# Patient Record
Sex: FEMALE | Race: WHITE | NOT HISPANIC OR LATINO | Employment: OTHER | URBAN - METROPOLITAN AREA
[De-identification: names, ages, dates, MRNs, and addresses within clinical notes are randomized per-mention and may not be internally consistent; named-entity substitution may affect disease eponyms.]

---

## 2017-02-21 ENCOUNTER — HISTORICAL (OUTPATIENT)
Dept: HEMATOLOGY/ONCOLOGY | Facility: CLINIC | Age: 62
End: 2017-02-21

## 2017-06-16 ENCOUNTER — HISTORICAL (OUTPATIENT)
Dept: HEMATOLOGY/ONCOLOGY | Facility: CLINIC | Age: 62
End: 2017-06-16

## 2017-06-16 LAB
ABS NEUT (OLG): 3.61 X10(3)/MCL (ref 2.1–9.2)
ALBUMIN SERPL-MCNC: 4 GM/DL (ref 3.4–5)
ALBUMIN/GLOB SERPL: 1.3 {RATIO}
ALP SERPL-CCNC: 84 UNIT/L (ref 38–126)
ALT SERPL-CCNC: 21 UNIT/L (ref 12–78)
AST SERPL-CCNC: 18 UNIT/L (ref 15–37)
BASOPHILS # BLD AUTO: 0 X10(3)/MCL (ref 0–0.2)
BASOPHILS NFR BLD AUTO: 0.5 %
BILIRUB SERPL-MCNC: 0.5 MG/DL (ref 0.2–1)
BILIRUBIN DIRECT+TOT PNL SERPL-MCNC: 0.1 MG/DL (ref 0–0.2)
BILIRUBIN DIRECT+TOT PNL SERPL-MCNC: 0.4 MG/DL (ref 0–0.8)
BUN SERPL-MCNC: 15 MG/DL (ref 7–18)
CALCIUM SERPL-MCNC: 8.5 MG/DL (ref 8.5–10.1)
CEA SERPL-MCNC: 2 NG/ML (ref 0–3)
CHLORIDE SERPL-SCNC: 100 MMOL/L (ref 98–107)
CO2 SERPL-SCNC: 30 MMOL/L (ref 21–32)
CREAT SERPL-MCNC: 0.84 MG/DL (ref 0.55–1.02)
EOSINOPHIL # BLD AUTO: 0.1 X10(3)/MCL (ref 0–0.9)
EOSINOPHIL NFR BLD AUTO: 1.1 %
ERYTHROCYTE [DISTWIDTH] IN BLOOD BY AUTOMATED COUNT: 14.1 % (ref 11.5–17)
GLOBULIN SER-MCNC: 3.1 GM/DL (ref 2.4–3.5)
GLUCOSE SERPL-MCNC: 92 MG/DL (ref 74–106)
HCT VFR BLD AUTO: 42.1 % (ref 37–47)
HGB BLD-MCNC: 13.6 GM/DL (ref 12–16)
LYMPHOCYTES # BLD AUTO: 2 X10(3)/MCL (ref 0.6–4.6)
LYMPHOCYTES NFR BLD AUTO: 32.9 %
MCH RBC QN AUTO: 29.4 PG (ref 27–31)
MCHC RBC AUTO-ENTMCNC: 32.3 GM/DL (ref 33–36)
MCV RBC AUTO: 90.9 FL (ref 80–94)
MONOCYTES # BLD AUTO: 0.5 X10(3)/MCL (ref 0.1–1.3)
MONOCYTES NFR BLD AUTO: 7.4 %
NEUTROPHILS # BLD AUTO: 3.6 X10(3)/MCL (ref 2.1–9.2)
NEUTROPHILS NFR BLD AUTO: 58.1 %
PHOSPHATE SERPL-MCNC: 5.1 MG/DL (ref 2.5–4.9)
PLATELET # BLD AUTO: 266 X10(3)/MCL (ref 130–400)
PMV BLD AUTO: 9.9 FL (ref 9.4–12.4)
POTASSIUM SERPL-SCNC: 5.1 MMOL/L (ref 3.5–5.1)
PROT SERPL-MCNC: 7.1 GM/DL (ref 6.4–8.2)
RBC # BLD AUTO: 4.63 X10(6)/MCL (ref 4.2–5.4)
SODIUM SERPL-SCNC: 138 MMOL/L (ref 136–145)
WBC # SPEC AUTO: 6.2 X10(3)/MCL (ref 4.5–11.5)

## 2017-07-17 ENCOUNTER — HISTORICAL (OUTPATIENT)
Dept: RADIOLOGY | Facility: HOSPITAL | Age: 62
End: 2017-07-17

## 2017-09-12 ENCOUNTER — HISTORICAL (OUTPATIENT)
Dept: ADMINISTRATIVE | Facility: HOSPITAL | Age: 62
End: 2017-09-12

## 2017-09-12 LAB
ALBUMIN SERPL-MCNC: 3.9 GM/DL (ref 3.4–5)
ALBUMIN/GLOB SERPL: 1.1 {RATIO}
ALP SERPL-CCNC: 98 UNIT/L (ref 38–126)
ALT SERPL-CCNC: 22 UNIT/L (ref 12–78)
APPEARANCE, UA: NORMAL
AST SERPL-CCNC: 15 UNIT/L (ref 15–37)
BACTERIA SPEC CULT: NORMAL /HPF
BILIRUB SERPL-MCNC: 0.7 MG/DL (ref 0.2–1)
BILIRUB UR QL STRIP: NEGATIVE
BILIRUBIN DIRECT+TOT PNL SERPL-MCNC: 0.1 MG/DL (ref 0–0.2)
BILIRUBIN DIRECT+TOT PNL SERPL-MCNC: 0.6 MG/DL (ref 0–0.8)
BUN SERPL-MCNC: 17 MG/DL (ref 7–18)
CALCIUM SERPL-MCNC: 8.4 MG/DL (ref 8.5–10.1)
CHLORIDE SERPL-SCNC: 101 MMOL/L (ref 98–107)
CHOLEST SERPL-MCNC: 290 MG/DL (ref 0–200)
CHOLEST/HDLC SERPL: 3.8 {RATIO} (ref 0–4)
CO2 SERPL-SCNC: 32 MMOL/L (ref 21–32)
COLOR UR: YELLOW
CREAT SERPL-MCNC: 0.78 MG/DL (ref 0.55–1.02)
GLOBULIN SER-MCNC: 3.5 GM/DL (ref 2.4–3.5)
GLUCOSE (UA): NEGATIVE
GLUCOSE SERPL-MCNC: 96 MG/DL (ref 74–106)
HDLC SERPL-MCNC: 76 MG/DL (ref 35–60)
HGB UR QL STRIP: NEGATIVE
KETONES UR QL STRIP: NEGATIVE
LDLC SERPL CALC-MCNC: 186 MG/DL (ref 0–129)
LEUKOCYTE ESTERASE UR QL STRIP: NEGATIVE
NITRITE UR QL STRIP: NEGATIVE
PH UR STRIP: 7.5 [PH] (ref 5–9)
POTASSIUM SERPL-SCNC: 4.2 MMOL/L (ref 3.5–5.1)
PROT SERPL-MCNC: 7.4 GM/DL (ref 6.4–8.2)
PROT UR QL STRIP: NEGATIVE
RBC #/AREA URNS HPF: NORMAL /[HPF]
SODIUM SERPL-SCNC: 140 MMOL/L (ref 136–145)
SP GR UR STRIP: 1.01 (ref 1–1.03)
SQUAMOUS EPITHELIAL, UA: NORMAL
TRIGL SERPL-MCNC: 140 MG/DL (ref 30–150)
TSH SERPL-ACNC: 2.9 MIU/ML (ref 0.36–3.74)
UROBILINOGEN UR STRIP-ACNC: 0.2
VLDLC SERPL CALC-MCNC: 28 MG/DL
WBC #/AREA URNS HPF: NORMAL /[HPF]

## 2017-12-06 ENCOUNTER — HISTORICAL (OUTPATIENT)
Dept: ADMINISTRATIVE | Facility: HOSPITAL | Age: 62
End: 2017-12-06

## 2017-12-06 LAB — H PYLORI AB SER IA-ACNC: POSITIVE

## 2017-12-21 ENCOUNTER — HISTORICAL (OUTPATIENT)
Dept: HEMATOLOGY/ONCOLOGY | Facility: CLINIC | Age: 62
End: 2017-12-21

## 2017-12-21 LAB
ABS NEUT (OLG): 3.56 X10(3)/MCL (ref 2.1–9.2)
ALBUMIN SERPL-MCNC: 3.9 GM/DL (ref 3.4–5)
ALBUMIN/GLOB SERPL: 1.1 RATIO (ref 1.1–2)
ALP SERPL-CCNC: 108 UNIT/L (ref 38–126)
ALT SERPL-CCNC: 37 UNIT/L (ref 12–78)
AST SERPL-CCNC: 29 UNIT/L (ref 15–37)
BASOPHILS # BLD AUTO: 0 X10(3)/MCL (ref 0–0.2)
BASOPHILS NFR BLD AUTO: 0.7 %
BILIRUB SERPL-MCNC: 0.3 MG/DL (ref 0.2–1)
BILIRUBIN DIRECT+TOT PNL SERPL-MCNC: 0.1 MG/DL (ref 0–0.5)
BILIRUBIN DIRECT+TOT PNL SERPL-MCNC: 0.2 MG/DL (ref 0–0.8)
BUN SERPL-MCNC: 19 MG/DL (ref 7–18)
CALCIUM SERPL-MCNC: 7.9 MG/DL (ref 8.5–10.1)
CEA SERPL-MCNC: 1 NG/ML (ref 0–3)
CHLORIDE SERPL-SCNC: 100 MMOL/L (ref 98–107)
CO2 SERPL-SCNC: 28 MMOL/L (ref 21–32)
CREAT SERPL-MCNC: 0.82 MG/DL (ref 0.55–1.02)
DEPRECATED CALCIDIOL+CALCIFEROL SERPL-MC: 37.21 NG/ML (ref 30–80)
EOSINOPHIL # BLD AUTO: 0.1 X10(3)/MCL (ref 0–0.9)
EOSINOPHIL NFR BLD AUTO: 0.9 %
ERYTHROCYTE [DISTWIDTH] IN BLOOD BY AUTOMATED COUNT: 14 % (ref 11.5–17)
GLOBULIN SER-MCNC: 3.6 GM/DL (ref 2.4–3.5)
GLUCOSE SERPL-MCNC: 90 MG/DL (ref 74–106)
HCT VFR BLD AUTO: 41.9 % (ref 37–47)
HGB BLD-MCNC: 13.7 GM/DL (ref 12–16)
LYMPHOCYTES # BLD AUTO: 3.3 X10(3)/MCL (ref 0.6–4.6)
LYMPHOCYTES NFR BLD AUTO: 43.6 %
MCH RBC QN AUTO: 28.8 PG (ref 27–31)
MCHC RBC AUTO-ENTMCNC: 32.7 GM/DL (ref 33–36)
MCV RBC AUTO: 88.2 FL (ref 80–94)
MONOCYTES # BLD AUTO: 0.6 X10(3)/MCL (ref 0.1–1.3)
MONOCYTES NFR BLD AUTO: 7.6 %
NEUTROPHILS # BLD AUTO: 3.6 X10(3)/MCL (ref 2.1–9.2)
NEUTROPHILS NFR BLD AUTO: 47.2 %
PLATELET # BLD AUTO: 300 X10(3)/MCL (ref 130–400)
PMV BLD AUTO: 9.3 FL (ref 9.4–12.4)
POTASSIUM SERPL-SCNC: 3.1 MMOL/L (ref 3.5–5.1)
PROT SERPL-MCNC: 7.5 GM/DL (ref 6.4–8.2)
RBC # BLD AUTO: 4.75 X10(6)/MCL (ref 4.2–5.4)
SODIUM SERPL-SCNC: 136 MMOL/L (ref 136–145)
WBC # SPEC AUTO: 7.5 X10(3)/MCL (ref 4.5–11.5)

## 2018-01-05 ENCOUNTER — HISTORICAL (OUTPATIENT)
Dept: LAB | Facility: HOSPITAL | Age: 63
End: 2018-01-05

## 2018-02-14 ENCOUNTER — HISTORICAL (OUTPATIENT)
Dept: LAB | Facility: HOSPITAL | Age: 63
End: 2018-02-14

## 2018-02-14 ENCOUNTER — HISTORICAL (OUTPATIENT)
Dept: RADIOLOGY | Facility: HOSPITAL | Age: 63
End: 2018-02-14

## 2018-02-14 LAB
ALBUMIN SERPL-MCNC: 4.2 GM/DL (ref 3.4–5)
ALBUMIN/GLOB SERPL: 1.2 RATIO (ref 1.1–2)
ALP SERPL-CCNC: 104 UNIT/L (ref 38–126)
ALT SERPL-CCNC: 19 UNIT/L (ref 12–78)
AST SERPL-CCNC: 18 UNIT/L (ref 15–37)
BILIRUB SERPL-MCNC: 0.5 MG/DL (ref 0.2–1)
BILIRUBIN DIRECT+TOT PNL SERPL-MCNC: 0.1 MG/DL (ref 0–0.5)
BILIRUBIN DIRECT+TOT PNL SERPL-MCNC: 0.4 MG/DL (ref 0–0.8)
BUN SERPL-MCNC: 16 MG/DL (ref 7–18)
CALCIUM SERPL-MCNC: 8.1 MG/DL (ref 8.5–10.1)
CHLORIDE SERPL-SCNC: 99 MMOL/L (ref 98–107)
CO2 SERPL-SCNC: 28 MMOL/L (ref 21–32)
CREAT SERPL-MCNC: 0.7 MG/DL (ref 0.55–1.02)
DEPRECATED CALCIDIOL+CALCIFEROL SERPL-MC: 29.75 NG/ML (ref 30–80)
GLOBULIN SER-MCNC: 3.5 GM/DL (ref 2.4–3.5)
GLUCOSE SERPL-MCNC: 81 MG/DL (ref 74–106)
POTASSIUM SERPL-SCNC: 4 MMOL/L (ref 3.5–5.1)
PROT SERPL-MCNC: 7.7 GM/DL (ref 6.4–8.2)
SODIUM SERPL-SCNC: 137 MMOL/L (ref 136–145)

## 2018-06-22 ENCOUNTER — HISTORICAL (OUTPATIENT)
Dept: HEMATOLOGY/ONCOLOGY | Facility: CLINIC | Age: 63
End: 2018-06-22

## 2018-06-22 LAB
ABS NEUT (OLG): 3.93 X10(3)/MCL (ref 2.1–9.2)
ALBUMIN SERPL-MCNC: 4 GM/DL (ref 3.4–5)
ALBUMIN/GLOB SERPL: 1.2 {RATIO}
ALP SERPL-CCNC: 94 UNIT/L (ref 38–126)
ALT SERPL-CCNC: 22 UNIT/L (ref 12–78)
AST SERPL-CCNC: 19 UNIT/L (ref 15–37)
BASOPHILS # BLD AUTO: 0 X10(3)/MCL (ref 0–0.2)
BASOPHILS NFR BLD AUTO: 0.3 %
BILIRUB SERPL-MCNC: 0.5 MG/DL (ref 0.2–1)
BILIRUBIN DIRECT+TOT PNL SERPL-MCNC: 0.1 MG/DL (ref 0–0.2)
BILIRUBIN DIRECT+TOT PNL SERPL-MCNC: 0.4 MG/DL (ref 0–0.8)
BUN SERPL-MCNC: 17 MG/DL (ref 7–18)
CALCIUM SERPL-MCNC: 7.7 MG/DL (ref 8.5–10.1)
CEA SERPL-MCNC: 1.6 NG/ML (ref 0–3)
CHLORIDE SERPL-SCNC: 101 MMOL/L (ref 98–107)
CO2 SERPL-SCNC: 30 MMOL/L (ref 21–32)
CREAT SERPL-MCNC: 1.01 MG/DL (ref 0.55–1.02)
EOSINOPHIL # BLD AUTO: 0 X10(3)/MCL (ref 0–0.9)
EOSINOPHIL NFR BLD AUTO: 0.6 %
ERYTHROCYTE [DISTWIDTH] IN BLOOD BY AUTOMATED COUNT: 14.4 % (ref 11.5–17)
GLOBULIN SER-MCNC: 3.4 GM/DL (ref 2.4–3.5)
GLUCOSE SERPL-MCNC: 98 MG/DL (ref 74–106)
HCT VFR BLD AUTO: 42.6 % (ref 37–47)
HGB BLD-MCNC: 14 GM/DL (ref 12–16)
LYMPHOCYTES # BLD AUTO: 1.9 X10(3)/MCL (ref 0.6–4.6)
LYMPHOCYTES NFR BLD AUTO: 30.3 %
MCH RBC QN AUTO: 29.2 PG (ref 27–31)
MCHC RBC AUTO-ENTMCNC: 32.9 GM/DL (ref 33–36)
MCV RBC AUTO: 88.8 FL (ref 80–94)
MONOCYTES # BLD AUTO: 0.4 X10(3)/MCL (ref 0.1–1.3)
MONOCYTES NFR BLD AUTO: 6.1 %
NEUTROPHILS # BLD AUTO: 3.9 X10(3)/MCL (ref 2.1–9.2)
NEUTROPHILS NFR BLD AUTO: 62.7 %
PLATELET # BLD AUTO: 256 X10(3)/MCL (ref 130–400)
PMV BLD AUTO: 10.1 FL (ref 9.4–12.4)
POTASSIUM SERPL-SCNC: 4.2 MMOL/L (ref 3.5–5.1)
PROT SERPL-MCNC: 7.4 GM/DL (ref 6.4–8.2)
RBC # BLD AUTO: 4.8 X10(6)/MCL (ref 4.2–5.4)
SODIUM SERPL-SCNC: 139 MMOL/L (ref 136–145)
WBC # SPEC AUTO: 6.3 X10(3)/MCL (ref 4.5–11.5)

## 2018-09-24 ENCOUNTER — HISTORICAL (OUTPATIENT)
Dept: LAB | Facility: HOSPITAL | Age: 63
End: 2018-09-24

## 2018-09-24 LAB
ALBUMIN SERPL-MCNC: 3.9 GM/DL (ref 3.4–5)
ALBUMIN/GLOB SERPL: 1.2 RATIO (ref 1.1–2)
ALP SERPL-CCNC: 82 UNIT/L (ref 38–126)
ALT SERPL-CCNC: 20 UNIT/L (ref 12–78)
AST SERPL-CCNC: 16 UNIT/L (ref 15–37)
BILIRUB SERPL-MCNC: 0.5 MG/DL (ref 0.2–1)
BILIRUBIN DIRECT+TOT PNL SERPL-MCNC: 0.1 MG/DL (ref 0–0.5)
BILIRUBIN DIRECT+TOT PNL SERPL-MCNC: 0.4 MG/DL (ref 0–0.8)
BUN SERPL-MCNC: 18 MG/DL (ref 7–18)
CALCIUM SERPL-MCNC: 8.3 MG/DL (ref 8.5–10.1)
CHLORIDE SERPL-SCNC: 100 MMOL/L (ref 98–107)
CO2 SERPL-SCNC: 32 MMOL/L (ref 21–32)
CREAT SERPL-MCNC: 1.24 MG/DL (ref 0.55–1.02)
DEPRECATED CALCIDIOL+CALCIFEROL SERPL-MC: 35.27 NG/ML (ref 30–80)
GLOBULIN SER-MCNC: 3.2 GM/DL (ref 2.4–3.5)
GLUCOSE SERPL-MCNC: 95 MG/DL (ref 74–106)
MAGNESIUM SERPL-MCNC: 2 MG/DL (ref 1.8–2.4)
POTASSIUM SERPL-SCNC: 3.9 MMOL/L (ref 3.5–5.1)
PROT SERPL-MCNC: 7.1 GM/DL (ref 6.4–8.2)
SODIUM SERPL-SCNC: 138 MMOL/L (ref 136–145)

## 2019-01-16 ENCOUNTER — HISTORICAL (OUTPATIENT)
Dept: HEMATOLOGY/ONCOLOGY | Facility: CLINIC | Age: 64
End: 2019-01-16

## 2019-01-16 LAB
ABS NEUT (OLG): 3.21 X10(3)/MCL (ref 2.1–9.2)
ALBUMIN SERPL-MCNC: 4 GM/DL (ref 3.4–5)
ALBUMIN/GLOB SERPL: 1.2 {RATIO}
ALP SERPL-CCNC: 91 UNIT/L (ref 38–126)
ALT SERPL-CCNC: 20 UNIT/L (ref 12–78)
AST SERPL-CCNC: 14 UNIT/L (ref 15–37)
BASOPHILS # BLD AUTO: 0 X10(3)/MCL (ref 0–0.2)
BASOPHILS NFR BLD AUTO: 0.4 %
BILIRUB SERPL-MCNC: 0.3 MG/DL (ref 0.2–1)
BILIRUBIN DIRECT+TOT PNL SERPL-MCNC: 0.1 MG/DL (ref 0–0.2)
BILIRUBIN DIRECT+TOT PNL SERPL-MCNC: 0.2 MG/DL (ref 0–0.8)
BUN SERPL-MCNC: 17 MG/DL (ref 7–18)
CALCIUM SERPL-MCNC: 7.8 MG/DL (ref 8.5–10.1)
CEA SERPL-MCNC: 1.5 NG/ML (ref 0–3)
CHLORIDE SERPL-SCNC: 100 MMOL/L (ref 98–107)
CO2 SERPL-SCNC: 31 MMOL/L (ref 21–32)
CREAT SERPL-MCNC: 0.83 MG/DL (ref 0.55–1.02)
EOSINOPHIL # BLD AUTO: 0.1 X10(3)/MCL (ref 0–0.9)
EOSINOPHIL NFR BLD AUTO: 1 %
ERYTHROCYTE [DISTWIDTH] IN BLOOD BY AUTOMATED COUNT: 13.6 % (ref 11.5–17)
GLOBULIN SER-MCNC: 3.2 GM/DL (ref 2.4–3.5)
GLUCOSE SERPL-MCNC: 94 MG/DL (ref 74–106)
HCT VFR BLD AUTO: 44.3 % (ref 37–47)
HGB BLD-MCNC: 14 GM/DL (ref 12–16)
LYMPHOCYTES # BLD AUTO: 3.1 X10(3)/MCL (ref 0.6–4.6)
LYMPHOCYTES NFR BLD AUTO: 45.1 %
MCH RBC QN AUTO: 28.7 PG (ref 27–31)
MCHC RBC AUTO-ENTMCNC: 31.6 GM/DL (ref 33–36)
MCV RBC AUTO: 90.8 FL (ref 80–94)
MONOCYTES # BLD AUTO: 0.5 X10(3)/MCL (ref 0.1–1.3)
MONOCYTES NFR BLD AUTO: 7.2 %
NEUTROPHILS # BLD AUTO: 3.2 X10(3)/MCL (ref 2.1–9.2)
NEUTROPHILS NFR BLD AUTO: 46.3 %
PLATELET # BLD AUTO: 294 X10(3)/MCL (ref 130–400)
PMV BLD AUTO: 9.9 FL (ref 9.4–12.4)
POTASSIUM SERPL-SCNC: 3.6 MMOL/L (ref 3.5–5.1)
PROT SERPL-MCNC: 7.2 GM/DL (ref 6.4–8.2)
RBC # BLD AUTO: 4.88 X10(6)/MCL (ref 4.2–5.4)
SODIUM SERPL-SCNC: 140 MMOL/L (ref 136–145)
WBC # SPEC AUTO: 6.9 X10(3)/MCL (ref 4.5–11.5)

## 2019-03-22 ENCOUNTER — HISTORICAL (OUTPATIENT)
Dept: ADMINISTRATIVE | Facility: HOSPITAL | Age: 64
End: 2019-03-22

## 2019-03-22 LAB
BUN SERPL-MCNC: 20 MG/DL (ref 7–18)
CALCIUM SERPL-MCNC: 7.9 MG/DL (ref 8.5–10.1)
CHLORIDE SERPL-SCNC: 100 MMOL/L (ref 98–107)
CO2 SERPL-SCNC: 31 MMOL/L (ref 21–32)
CREAT SERPL-MCNC: 0.79 MG/DL (ref 0.55–1.02)
CREAT/UREA NIT SERPL: 25.3
GLUCOSE SERPL-MCNC: 90 MG/DL (ref 74–106)
POTASSIUM SERPL-SCNC: 4.3 MMOL/L (ref 3.5–5.1)
SODIUM SERPL-SCNC: 138 MMOL/L (ref 136–145)
TSH SERPL-ACNC: 1.87 MIU/L (ref 0.36–3.74)

## 2019-06-05 ENCOUNTER — HISTORICAL (OUTPATIENT)
Dept: ADMINISTRATIVE | Facility: HOSPITAL | Age: 64
End: 2019-06-05

## 2019-06-05 LAB
ABS NEUT (OLG): 3.49 X10(3)/MCL (ref 2.1–9.2)
ALBUMIN SERPL-MCNC: 4 GM/DL (ref 3.4–5)
ALBUMIN/GLOB SERPL: 1.1 RATIO (ref 1.1–2)
ALP SERPL-CCNC: 90 UNIT/L (ref 38–126)
ALT SERPL-CCNC: 21 UNIT/L (ref 12–78)
AST SERPL-CCNC: 18 UNIT/L (ref 15–37)
BASOPHILS # BLD AUTO: 0 X10(3)/MCL (ref 0–0.2)
BASOPHILS NFR BLD AUTO: 0.5 %
BILIRUB SERPL-MCNC: 0.5 MG/DL (ref 0.2–1)
BILIRUBIN DIRECT+TOT PNL SERPL-MCNC: 0.1 MG/DL (ref 0–0.5)
BILIRUBIN DIRECT+TOT PNL SERPL-MCNC: 0.4 MG/DL (ref 0–0.8)
BUN SERPL-MCNC: 21 MG/DL (ref 7–18)
CALCIUM SERPL-MCNC: 8.9 MG/DL (ref 8.5–10.1)
CEA SERPL-MCNC: 1.4 NG/ML (ref 0–3)
CHLORIDE SERPL-SCNC: 101 MMOL/L (ref 98–107)
CO2 SERPL-SCNC: 30 MMOL/L (ref 21–32)
CREAT SERPL-MCNC: 0.96 MG/DL (ref 0.55–1.02)
DEPRECATED CALCIDIOL+CALCIFEROL SERPL-MC: 32.28 NG/ML (ref 30–80)
EOSINOPHIL # BLD AUTO: 0.1 X10(3)/MCL (ref 0–0.9)
EOSINOPHIL NFR BLD AUTO: 1.1 %
ERYTHROCYTE [DISTWIDTH] IN BLOOD BY AUTOMATED COUNT: 13.2 % (ref 11.5–17)
GLOBULIN SER-MCNC: 3.5 GM/DL (ref 2.4–3.5)
GLUCOSE SERPL-MCNC: 72 MG/DL (ref 74–106)
HCT VFR BLD AUTO: 46 % (ref 37–47)
HGB BLD-MCNC: 14.6 GM/DL (ref 12–16)
LYMPHOCYTES # BLD AUTO: 2.1 X10(3)/MCL (ref 0.6–4.6)
LYMPHOCYTES NFR BLD AUTO: 34.1 %
MCH RBC QN AUTO: 28.7 PG (ref 27–31)
MCHC RBC AUTO-ENTMCNC: 31.7 GM/DL (ref 33–36)
MCV RBC AUTO: 90.6 FL (ref 80–94)
MONOCYTES # BLD AUTO: 0.5 X10(3)/MCL (ref 0.1–1.3)
MONOCYTES NFR BLD AUTO: 7.6 %
NEUTROPHILS # BLD AUTO: 3.5 X10(3)/MCL (ref 2.1–9.2)
NEUTROPHILS NFR BLD AUTO: 56.7 %
PLATELET # BLD AUTO: 305 X10(3)/MCL (ref 130–400)
PMV BLD AUTO: 9.8 FL (ref 9.4–12.4)
POTASSIUM SERPL-SCNC: 4.7 MMOL/L (ref 3.5–5.1)
PROT SERPL-MCNC: 7.5 GM/DL (ref 6.4–8.2)
RBC # BLD AUTO: 5.08 X10(6)/MCL (ref 4.2–5.4)
SODIUM SERPL-SCNC: 139 MMOL/L (ref 136–145)
WBC # SPEC AUTO: 6.2 X10(3)/MCL (ref 4.5–11.5)

## 2019-07-19 ENCOUNTER — HISTORICAL (OUTPATIENT)
Dept: RADIOLOGY | Facility: HOSPITAL | Age: 64
End: 2019-07-19

## 2019-12-23 ENCOUNTER — HISTORICAL (OUTPATIENT)
Dept: ADMINISTRATIVE | Facility: HOSPITAL | Age: 64
End: 2019-12-23

## 2019-12-23 LAB
ABS NEUT (OLG): 3.15 X10(3)/MCL (ref 2.1–9.2)
ALBUMIN SERPL-MCNC: 3.9 GM/DL (ref 3.4–5)
ALBUMIN/GLOB SERPL: 1.2 {RATIO}
ALP SERPL-CCNC: 96 UNIT/L (ref 38–126)
ALT SERPL-CCNC: 20 UNIT/L (ref 12–78)
APPEARANCE, UA: ABNORMAL
AST SERPL-CCNC: 15 UNIT/L (ref 15–37)
BACTERIA SPEC CULT: ABNORMAL /HPF
BASOPHILS # BLD AUTO: 0 X10(3)/MCL (ref 0–0.2)
BASOPHILS NFR BLD AUTO: 1 %
BILIRUB SERPL-MCNC: 1 MG/DL (ref 0.2–1)
BILIRUB UR QL STRIP: NEGATIVE
BILIRUBIN DIRECT+TOT PNL SERPL-MCNC: 0.2 MG/DL (ref 0–0.2)
BILIRUBIN DIRECT+TOT PNL SERPL-MCNC: 0.8 MG/DL (ref 0–0.8)
BUN SERPL-MCNC: 17 MG/DL (ref 7–18)
CALCIUM SERPL-MCNC: 8.1 MG/DL (ref 8.5–10.1)
CEA SERPL-MCNC: 1.9 NG/ML (ref 0–3)
CHLORIDE SERPL-SCNC: 102 MMOL/L (ref 98–107)
CHOLEST SERPL-MCNC: 279 MG/DL (ref 0–200)
CHOLEST/HDLC SERPL: 3.5 {RATIO} (ref 0–4)
CO2 SERPL-SCNC: 30 MMOL/L (ref 21–32)
COLOR UR: YELLOW
CREAT SERPL-MCNC: 0.86 MG/DL (ref 0.55–1.02)
DEPRECATED CALCIDIOL+CALCIFEROL SERPL-MC: 35.82 NG/ML (ref 30–80)
EOSINOPHIL # BLD AUTO: 0.1 X10(3)/MCL (ref 0–0.9)
EOSINOPHIL NFR BLD AUTO: 1 %
ERYTHROCYTE [DISTWIDTH] IN BLOOD BY AUTOMATED COUNT: 13.9 % (ref 11.5–17)
EST. AVERAGE GLUCOSE BLD GHB EST-MCNC: 131 MG/DL
GLOBULIN SER-MCNC: 3.2 GM/DL (ref 2.4–3.5)
GLUCOSE (UA): NEGATIVE
GLUCOSE SERPL-MCNC: 104 MG/DL (ref 74–106)
HBA1C MFR BLD: 6.2 % (ref 4.2–6.3)
HCT VFR BLD AUTO: 44.6 % (ref 37–47)
HDLC SERPL-MCNC: 80 MG/DL (ref 35–60)
HGB BLD-MCNC: 14.4 GM/DL (ref 12–16)
HGB UR QL STRIP: ABNORMAL
KETONES UR QL STRIP: NEGATIVE
LDLC SERPL CALC-MCNC: 179 MG/DL (ref 0–129)
LEUKOCYTE ESTERASE UR QL STRIP: ABNORMAL
LYMPHOCYTES # BLD AUTO: 2.2 X10(3)/MCL (ref 0.6–4.6)
LYMPHOCYTES NFR BLD AUTO: 38 %
MCH RBC QN AUTO: 29 PG (ref 27–31)
MCHC RBC AUTO-ENTMCNC: 32.3 GM/DL (ref 33–36)
MCV RBC AUTO: 89.7 FL (ref 80–94)
MONOCYTES # BLD AUTO: 0.4 X10(3)/MCL (ref 0.1–1.3)
MONOCYTES NFR BLD AUTO: 6 %
NEUTROPHILS # BLD AUTO: 3.15 X10(3)/MCL (ref 2.1–9.2)
NEUTROPHILS NFR BLD AUTO: 53 %
NITRITE UR QL STRIP: NEGATIVE
PH UR STRIP: 8 [PH] (ref 5–9)
PLATELET # BLD AUTO: 265 X10(3)/MCL (ref 130–400)
PMV BLD AUTO: 9.8 FL (ref 9.4–12.4)
POTASSIUM SERPL-SCNC: 4.5 MMOL/L (ref 3.5–5.1)
PROT SERPL-MCNC: 7.1 GM/DL (ref 6.4–8.2)
PROT UR QL STRIP: NEGATIVE
RBC # BLD AUTO: 4.97 X10(6)/MCL (ref 4.2–5.4)
RBC #/AREA URNS HPF: 10 /HPF (ref 0–2)
SODIUM SERPL-SCNC: 139 MMOL/L (ref 136–145)
SP GR UR STRIP: 1.02 (ref 1–1.03)
SQUAMOUS EPITHELIAL, UA: ABNORMAL
TRIGL SERPL-MCNC: 99 MG/DL (ref 30–150)
TSH SERPL-ACNC: 2.39 MIU/L (ref 0.36–3.74)
UROBILINOGEN UR STRIP-ACNC: 0.2
VLDLC SERPL CALC-MCNC: 20 MG/DL
WBC # SPEC AUTO: 5.9 X10(3)/MCL (ref 4.5–11.5)
WBC #/AREA URNS HPF: ABNORMAL /[HPF]

## 2020-05-29 ENCOUNTER — HISTORICAL (OUTPATIENT)
Dept: ADMINISTRATIVE | Facility: HOSPITAL | Age: 65
End: 2020-05-29

## 2020-05-29 LAB
ALBUMIN SERPL-MCNC: 4.3 GM/DL (ref 3.4–4.8)
ALBUMIN/GLOB SERPL: 1.6 RATIO (ref 1.1–2)
ALP SERPL-CCNC: 92 UNIT/L (ref 40–150)
ALT SERPL-CCNC: 13 UNIT/L (ref 0–55)
APPEARANCE, UA: CLEAR
AST SERPL-CCNC: 17 UNIT/L (ref 5–34)
BACTERIA SPEC CULT: NORMAL /HPF
BILIRUB SERPL-MCNC: 0.6 MG/DL
BILIRUB UR QL STRIP: NEGATIVE
BILIRUBIN DIRECT+TOT PNL SERPL-MCNC: 0.2 MG/DL (ref 0–0.5)
BILIRUBIN DIRECT+TOT PNL SERPL-MCNC: 0.4 MG/DL (ref 0–0.8)
BUN SERPL-MCNC: 13.7 MG/DL (ref 9.8–20.1)
CALCIUM SERPL-MCNC: 8.1 MG/DL (ref 8.4–10.2)
CHLORIDE SERPL-SCNC: 102 MMOL/L (ref 98–107)
CHOLEST SERPL-MCNC: 290 MG/DL
CHOLEST/HDLC SERPL: 5 {RATIO} (ref 0–5)
CO2 SERPL-SCNC: 31 MMOL/L (ref 23–31)
COLOR UR: YELLOW
CREAT SERPL-MCNC: 0.88 MG/DL (ref 0.55–1.02)
EST. AVERAGE GLUCOSE BLD GHB EST-MCNC: 128.4 MG/DL
GLOBULIN SER-MCNC: 2.7 GM/DL (ref 2.4–3.5)
GLUCOSE (UA): NEGATIVE
GLUCOSE SERPL-MCNC: 102 MG/DL (ref 82–115)
HBA1C MFR BLD: 6.1 %
HDLC SERPL-MCNC: 61 MG/DL (ref 35–60)
HGB UR QL STRIP: NEGATIVE
KETONES UR QL STRIP: NEGATIVE
LDLC SERPL CALC-MCNC: 201 MG/DL (ref 50–140)
LEUKOCYTE ESTERASE UR QL STRIP: NEGATIVE
NITRITE UR QL STRIP: NEGATIVE
PH UR STRIP: >=9 [PH] (ref 5–9)
POTASSIUM SERPL-SCNC: 4.7 MMOL/L (ref 3.5–5.1)
PROT SERPL-MCNC: 7 GM/DL (ref 5.8–7.6)
PROT UR QL STRIP: NEGATIVE
RBC #/AREA URNS HPF: NORMAL /[HPF]
SODIUM SERPL-SCNC: 142 MMOL/L (ref 136–145)
SP GR UR STRIP: 1.02 (ref 1–1.03)
SQUAMOUS EPITHELIAL, UA: NORMAL
TRIGL SERPL-MCNC: 138 MG/DL (ref 37–140)
TSH SERPL-ACNC: 1.08 UIU/ML (ref 0.35–4.94)
UROBILINOGEN UR STRIP-ACNC: 0.2
VLDLC SERPL CALC-MCNC: 28 MG/DL
WBC #/AREA URNS HPF: NORMAL /[HPF]

## 2020-06-15 ENCOUNTER — HISTORICAL (OUTPATIENT)
Dept: RADIOLOGY | Facility: HOSPITAL | Age: 65
End: 2020-06-15

## 2020-06-26 ENCOUNTER — HISTORICAL (OUTPATIENT)
Dept: HEMATOLOGY/ONCOLOGY | Facility: CLINIC | Age: 65
End: 2020-06-26

## 2020-06-26 LAB
ABS NEUT (OLG): 3.63 X10(3)/MCL (ref 2.1–9.2)
ALBUMIN SERPL-MCNC: 4.2 GM/DL (ref 3.4–5)
ALBUMIN/GLOB SERPL: 1.4 RATIO (ref 1.1–2)
ALP SERPL-CCNC: 101 UNIT/L (ref 40–150)
ALT SERPL-CCNC: 21 UNIT/L (ref 0–55)
AST SERPL-CCNC: 27 UNIT/L (ref 5–34)
BASOPHILS # BLD AUTO: 0 X10(3)/MCL (ref 0–0.2)
BASOPHILS NFR BLD AUTO: 0.5 %
BILIRUB SERPL-MCNC: 0.7 MG/DL
BILIRUBIN DIRECT+TOT PNL SERPL-MCNC: 0.2 MG/DL (ref 0–0.5)
BILIRUBIN DIRECT+TOT PNL SERPL-MCNC: 0.5 MG/DL (ref 0–0.8)
BUN SERPL-MCNC: 14.1 MG/DL (ref 9.8–20.1)
CALCIUM SERPL-MCNC: 8.1 MG/DL (ref 8.4–10.2)
CEA SERPL-MCNC: <1.73 MG/ML (ref 0–3)
CHLORIDE SERPL-SCNC: 98 MMOL/L (ref 98–107)
CO2 SERPL-SCNC: 29 MMOL/L (ref 23–31)
CREAT SERPL-MCNC: 0.81 MG/DL (ref 0.55–1.02)
EOSINOPHIL # BLD AUTO: 0 X10(3)/MCL (ref 0–0.9)
EOSINOPHIL NFR BLD AUTO: 0.8 %
ERYTHROCYTE [DISTWIDTH] IN BLOOD BY AUTOMATED COUNT: 13.6 % (ref 11.5–17)
GLOBULIN SER-MCNC: 3.1 GM/DL (ref 2.4–3.5)
GLUCOSE SERPL-MCNC: 145 MG/DL (ref 82–115)
HCT VFR BLD AUTO: 45.7 % (ref 37–47)
HGB BLD-MCNC: 14.6 GM/DL (ref 12–16)
LYMPHOCYTES # BLD AUTO: 2.2 X10(3)/MCL (ref 0.6–4.6)
LYMPHOCYTES NFR BLD AUTO: 34.3 %
MCH RBC QN AUTO: 29 PG (ref 27–31)
MCHC RBC AUTO-ENTMCNC: 31.9 GM/DL (ref 33–36)
MCV RBC AUTO: 90.9 FL (ref 80–94)
MONOCYTES # BLD AUTO: 0.4 X10(3)/MCL (ref 0.1–1.3)
MONOCYTES NFR BLD AUTO: 6.5 %
NEUTROPHILS # BLD AUTO: 3.6 X10(3)/MCL (ref 2.1–9.2)
NEUTROPHILS NFR BLD AUTO: 57.7 %
PLATELET # BLD AUTO: 274 X10(3)/MCL (ref 130–400)
PMV BLD AUTO: 9.9 FL (ref 9.4–12.4)
POTASSIUM SERPL-SCNC: 4.8 MMOL/L (ref 3.5–5.1)
PROT SERPL-MCNC: 7.3 GM/DL (ref 5.8–7.6)
RBC # BLD AUTO: 5.03 X10(6)/MCL (ref 4.2–5.4)
SODIUM SERPL-SCNC: 140 MMOL/L (ref 136–145)
WBC # SPEC AUTO: 6.3 X10(3)/MCL (ref 4.5–11.5)

## 2020-07-10 ENCOUNTER — HISTORICAL (OUTPATIENT)
Dept: ADMINISTRATIVE | Facility: HOSPITAL | Age: 65
End: 2020-07-10

## 2020-07-10 LAB
CHOLEST SERPL-MCNC: 308 MG/DL
CHOLEST/HDLC SERPL: 5 {RATIO} (ref 0–5)
HDLC SERPL-MCNC: 68 MG/DL (ref 35–60)
LDLC SERPL CALC-MCNC: 215 MG/DL (ref 50–140)
TRIGL SERPL-MCNC: 127 MG/DL (ref 37–140)
VLDLC SERPL CALC-MCNC: 25 MG/DL

## 2020-11-24 LAB — CRC RECOMMENDATION EXT: NORMAL

## 2021-01-14 ENCOUNTER — HISTORICAL (OUTPATIENT)
Dept: HEMATOLOGY/ONCOLOGY | Facility: CLINIC | Age: 66
End: 2021-01-14

## 2021-01-14 LAB
ABS NEUT (OLG): 3.67 X10(3)/MCL (ref 2.1–9.2)
ALBUMIN SERPL-MCNC: 4.2 GM/DL (ref 3.4–4.8)
ALBUMIN/GLOB SERPL: 1.4 RATIO (ref 1.1–2)
ALP SERPL-CCNC: 90 UNIT/L (ref 40–150)
ALT SERPL-CCNC: 16 UNIT/L (ref 0–55)
AST SERPL-CCNC: 20 UNIT/L (ref 5–34)
BASOPHILS # BLD AUTO: 0 X10(3)/MCL (ref 0–0.2)
BASOPHILS NFR BLD AUTO: 1 %
BILIRUB SERPL-MCNC: 0.7 MG/DL
BILIRUBIN DIRECT+TOT PNL SERPL-MCNC: 0.2 MG/DL (ref 0–0.5)
BILIRUBIN DIRECT+TOT PNL SERPL-MCNC: 0.5 MG/DL (ref 0–0.8)
BUN SERPL-MCNC: 11.5 MG/DL (ref 9.8–20.1)
CALCIUM SERPL-MCNC: 7.5 MG/DL (ref 8.4–10.2)
CEA SERPL-MCNC: <1.73 NG/ML (ref 0–3)
CHLORIDE SERPL-SCNC: 99 MMOL/L (ref 98–107)
CO2 SERPL-SCNC: 27 MMOL/L (ref 23–31)
CREAT SERPL-MCNC: 0.77 MG/DL (ref 0.55–1.02)
EOSINOPHIL # BLD AUTO: 0.1 X10(3)/MCL (ref 0–0.9)
EOSINOPHIL NFR BLD AUTO: 1 %
ERYTHROCYTE [DISTWIDTH] IN BLOOD BY AUTOMATED COUNT: 13.7 % (ref 11.5–17)
GLOBULIN SER-MCNC: 2.9 GM/DL (ref 2.4–3.5)
GLUCOSE SERPL-MCNC: 111 MG/DL (ref 82–115)
HCT VFR BLD AUTO: 44.4 % (ref 37–47)
HGB BLD-MCNC: 14.5 GM/DL (ref 12–16)
LYMPHOCYTES # BLD AUTO: 2.1 X10(3)/MCL (ref 0.6–4.6)
LYMPHOCYTES NFR BLD AUTO: 34 %
MCH RBC QN AUTO: 28.9 PG (ref 27–31)
MCHC RBC AUTO-ENTMCNC: 32.7 GM/DL (ref 33–36)
MCV RBC AUTO: 88.6 FL (ref 80–94)
MONOCYTES # BLD AUTO: 0.4 X10(3)/MCL (ref 0.1–1.3)
MONOCYTES NFR BLD AUTO: 7 %
NEUTROPHILS # BLD AUTO: 3.7 X10(3)/MCL (ref 2.1–9.2)
NEUTROPHILS NFR BLD AUTO: 58 %
PLATELET # BLD AUTO: 286 X10(3)/MCL (ref 130–400)
PMV BLD AUTO: 9.8 FL (ref 9.4–12.4)
POTASSIUM SERPL-SCNC: 4.1 MMOL/L (ref 3.5–5.1)
PROT SERPL-MCNC: 7.1 GM/DL (ref 5.8–7.6)
RBC # BLD AUTO: 5.01 X10(6)/MCL (ref 4.2–5.4)
SODIUM SERPL-SCNC: 139 MMOL/L (ref 136–145)
WBC # SPEC AUTO: 6.3 X10(3)/MCL (ref 4.5–11.5)

## 2021-02-12 ENCOUNTER — HISTORICAL (OUTPATIENT)
Dept: ADMINISTRATIVE | Facility: HOSPITAL | Age: 66
End: 2021-02-12

## 2021-02-12 LAB
ABS NEUT (OLG): 3.58 X10(3)/MCL (ref 2.1–9.2)
ALBUMIN SERPL-MCNC: 4.1 GM/DL (ref 3.4–4.8)
ALBUMIN/GLOB SERPL: 1.8 RATIO (ref 1.1–2)
ALP SERPL-CCNC: 84 UNIT/L (ref 40–150)
ALT SERPL-CCNC: 12 UNIT/L (ref 0–55)
APPEARANCE, UA: CLEAR
AST SERPL-CCNC: 17 UNIT/L (ref 5–34)
BACTERIA SPEC CULT: ABNORMAL /HPF
BASOPHILS # BLD AUTO: 0 X10(3)/MCL (ref 0–0.2)
BASOPHILS NFR BLD AUTO: 1 %
BILIRUB SERPL-MCNC: 0.7 MG/DL
BILIRUB UR QL STRIP: NEGATIVE
BILIRUBIN DIRECT+TOT PNL SERPL-MCNC: 0.2 MG/DL (ref 0–0.5)
BILIRUBIN DIRECT+TOT PNL SERPL-MCNC: 0.5 MG/DL (ref 0–0.8)
BUN SERPL-MCNC: 12.3 MG/DL (ref 9.8–20.1)
CALCIUM SERPL-MCNC: 7.8 MG/DL (ref 8.4–10.2)
CHLORIDE SERPL-SCNC: 102 MMOL/L (ref 98–107)
CHOLEST SERPL-MCNC: 266 MG/DL
CHOLEST/HDLC SERPL: 5 {RATIO} (ref 0–5)
CO2 SERPL-SCNC: 28 MMOL/L (ref 23–31)
COLOR UR: YELLOW
CREAT SERPL-MCNC: 0.81 MG/DL (ref 0.55–1.02)
DEPRECATED CALCIDIOL+CALCIFEROL SERPL-MC: 43.6 NG/ML (ref 30–80)
EOSINOPHIL # BLD AUTO: 0.1 X10(3)/MCL (ref 0–0.9)
EOSINOPHIL NFR BLD AUTO: 1 %
ERYTHROCYTE [DISTWIDTH] IN BLOOD BY AUTOMATED COUNT: 13.9 % (ref 11.5–17)
EST. AVERAGE GLUCOSE BLD GHB EST-MCNC: 125.5 MG/DL
GLOBULIN SER-MCNC: 2.3 GM/DL (ref 2.4–3.5)
GLUCOSE (UA): NEGATIVE
GLUCOSE SERPL-MCNC: 98 MG/DL (ref 82–115)
HBA1C MFR BLD: 6 %
HCT VFR BLD AUTO: 45.2 % (ref 37–47)
HDLC SERPL-MCNC: 59 MG/DL (ref 35–60)
HGB BLD-MCNC: 14.4 GM/DL (ref 12–16)
HGB UR QL STRIP: ABNORMAL
KETONES UR QL STRIP: NEGATIVE
LDLC SERPL CALC-MCNC: 189 MG/DL (ref 50–140)
LEUKOCYTE ESTERASE UR QL STRIP: ABNORMAL
LYMPHOCYTES # BLD AUTO: 1.9 X10(3)/MCL (ref 0.6–4.6)
LYMPHOCYTES NFR BLD AUTO: 31 %
MCH RBC QN AUTO: 28.9 PG (ref 27–31)
MCHC RBC AUTO-ENTMCNC: 31.9 GM/DL (ref 33–36)
MCV RBC AUTO: 90.6 FL (ref 80–94)
MONOCYTES # BLD AUTO: 0.5 X10(3)/MCL (ref 0.1–1.3)
MONOCYTES NFR BLD AUTO: 8 %
NEUTROPHILS # BLD AUTO: 3.58 X10(3)/MCL (ref 2.1–9.2)
NEUTROPHILS NFR BLD AUTO: 59 %
NITRITE UR QL STRIP: NEGATIVE
PH UR STRIP: 7.5 [PH] (ref 5–9)
PLATELET # BLD AUTO: 290 X10(3)/MCL (ref 130–400)
PMV BLD AUTO: 10.8 FL (ref 9.4–12.4)
POTASSIUM SERPL-SCNC: 4 MMOL/L (ref 3.5–5.1)
PROT SERPL-MCNC: 6.4 GM/DL (ref 5.8–7.6)
PROT UR QL STRIP: NEGATIVE
RBC # BLD AUTO: 4.99 X10(6)/MCL (ref 4.2–5.4)
RBC #/AREA URNS HPF: ABNORMAL /HPF
SODIUM SERPL-SCNC: 141 MMOL/L (ref 136–145)
SP GR UR STRIP: 1.02 (ref 1–1.03)
SQUAMOUS EPITHELIAL, UA: 0 /HPF (ref 0–4)
TRIGL SERPL-MCNC: 90 MG/DL (ref 37–140)
TSH SERPL-ACNC: 0.82 UIU/ML (ref 0.35–4.94)
UA WBC MAN: ABNORMAL /HPF
UROBILINOGEN UR STRIP-ACNC: 0.2
VLDLC SERPL CALC-MCNC: 18 MG/DL
WBC # SPEC AUTO: 6 X10(3)/MCL (ref 4.5–11.5)

## 2021-04-19 ENCOUNTER — HISTORICAL (OUTPATIENT)
Dept: ADMINISTRATIVE | Facility: HOSPITAL | Age: 66
End: 2021-04-19

## 2021-04-19 LAB
ABS NEUT (OLG): 3.63 X10(3)/MCL (ref 2.1–9.2)
ALBUMIN SERPL-MCNC: 4.2 GM/DL (ref 3.4–4.8)
ALBUMIN/GLOB SERPL: 1.5 RATIO (ref 1.1–2)
ALP SERPL-CCNC: 88 UNIT/L (ref 40–150)
ALT SERPL-CCNC: 16 UNIT/L (ref 0–55)
APPEARANCE, UA: ABNORMAL
AST SERPL-CCNC: 20 UNIT/L (ref 5–34)
BACTERIA SPEC CULT: ABNORMAL /HPF
BASOPHILS # BLD AUTO: 0 X10(3)/MCL (ref 0–0.2)
BASOPHILS NFR BLD AUTO: 1 %
BILIRUB SERPL-MCNC: 0.6 MG/DL
BILIRUB UR QL STRIP: NEGATIVE
BILIRUBIN DIRECT+TOT PNL SERPL-MCNC: 0.2 MG/DL (ref 0–0.5)
BILIRUBIN DIRECT+TOT PNL SERPL-MCNC: 0.4 MG/DL (ref 0–0.8)
BUN SERPL-MCNC: 11.3 MG/DL (ref 9.8–20.1)
CALCIUM SERPL-MCNC: 8.6 MG/DL (ref 8.4–10.2)
CHLORIDE SERPL-SCNC: 102 MMOL/L (ref 98–107)
CHOLEST SERPL-MCNC: 181 MG/DL
CHOLEST/HDLC SERPL: 3 {RATIO} (ref 0–5)
CO2 SERPL-SCNC: 32 MMOL/L (ref 23–31)
COLOR UR: YELLOW
CREAT SERPL-MCNC: 0.8 MG/DL (ref 0.55–1.02)
DEPRECATED CALCIDIOL+CALCIFEROL SERPL-MC: 46.1 NG/ML (ref 30–80)
EOSINOPHIL # BLD AUTO: 0.1 X10(3)/MCL (ref 0–0.9)
EOSINOPHIL NFR BLD AUTO: 1 %
ERYTHROCYTE [DISTWIDTH] IN BLOOD BY AUTOMATED COUNT: 13.5 % (ref 11.5–17)
EST. AVERAGE GLUCOSE BLD GHB EST-MCNC: 125.5 MG/DL
GLOBULIN SER-MCNC: 2.8 GM/DL (ref 2.4–3.5)
GLUCOSE (UA): NEGATIVE
GLUCOSE SERPL-MCNC: 105 MG/DL (ref 82–115)
HBA1C MFR BLD: 6 %
HCT VFR BLD AUTO: 46.2 % (ref 37–47)
HDLC SERPL-MCNC: 63 MG/DL (ref 35–60)
HGB BLD-MCNC: 14.9 GM/DL (ref 12–16)
HGB UR QL STRIP: ABNORMAL
KETONES UR QL STRIP: NEGATIVE
LDLC SERPL CALC-MCNC: 98 MG/DL (ref 50–140)
LEUKOCYTE ESTERASE UR QL STRIP: ABNORMAL
LYMPHOCYTES # BLD AUTO: 2.6 X10(3)/MCL (ref 0.6–4.6)
LYMPHOCYTES NFR BLD AUTO: 38 %
MCH RBC QN AUTO: 28.9 PG (ref 27–31)
MCHC RBC AUTO-ENTMCNC: 32.3 GM/DL (ref 33–36)
MCV RBC AUTO: 89.5 FL (ref 80–94)
MONOCYTES # BLD AUTO: 0.5 X10(3)/MCL (ref 0.1–1.3)
MONOCYTES NFR BLD AUTO: 7 %
NEUTROPHILS # BLD AUTO: 3.63 X10(3)/MCL (ref 2.1–9.2)
NEUTROPHILS NFR BLD AUTO: 54 %
NITRITE UR QL STRIP: NEGATIVE
PH UR STRIP: 7 [PH] (ref 5–9)
PLATELET # BLD AUTO: 306 X10(3)/MCL (ref 130–400)
PMV BLD AUTO: 10.7 FL (ref 9.4–12.4)
POTASSIUM SERPL-SCNC: 4.3 MMOL/L (ref 3.5–5.1)
PROT SERPL-MCNC: 7 GM/DL (ref 5.8–7.6)
PROT UR QL STRIP: NEGATIVE
RBC # BLD AUTO: 5.16 X10(6)/MCL (ref 4.2–5.4)
RBC #/AREA URNS HPF: 20 /HPF (ref 0–2)
SODIUM SERPL-SCNC: 141 MMOL/L (ref 136–145)
SP GR UR STRIP: 1.02 (ref 1–1.03)
SQUAMOUS EPITHELIAL, UA: 18 /HPF (ref 0–4)
TRIGL SERPL-MCNC: 99 MG/DL (ref 37–140)
TSH SERPL-ACNC: 1.61 UIU/ML (ref 0.35–4.94)
UROBILINOGEN UR STRIP-ACNC: 0.2
VLDLC SERPL CALC-MCNC: 20 MG/DL
WBC # SPEC AUTO: 6.8 X10(3)/MCL (ref 4.5–11.5)
WBC #/AREA URNS HPF: 13 /HPF (ref 0–3)

## 2021-04-21 ENCOUNTER — HISTORICAL (OUTPATIENT)
Dept: ADMINISTRATIVE | Facility: HOSPITAL | Age: 66
End: 2021-04-21

## 2021-04-21 LAB
APPEARANCE, UA: CLEAR
BACTERIA SPEC CULT: ABNORMAL /HPF
BILIRUB UR QL STRIP: NEGATIVE
COLOR UR: YELLOW
FINAL CULTURE: NORMAL
GLUCOSE (UA): NEGATIVE
HGB UR QL STRIP: NEGATIVE
KETONES UR QL STRIP: NEGATIVE
LEUKOCYTE ESTERASE UR QL STRIP: NEGATIVE
NITRITE UR QL STRIP: NEGATIVE
PH UR STRIP: 6.5 [PH] (ref 5–9)
PROT UR QL STRIP: NEGATIVE
RBC #/AREA URNS HPF: ABNORMAL /HPF
SP GR UR STRIP: 1.01 (ref 1–1.03)
SQUAMOUS EPITHELIAL, UA: ABNORMAL /HPF
UROBILINOGEN UR STRIP-ACNC: 0.2
WBC #/AREA URNS HPF: ABNORMAL /HPF

## 2021-04-23 ENCOUNTER — HISTORICAL (OUTPATIENT)
Dept: RADIOLOGY | Facility: HOSPITAL | Age: 66
End: 2021-04-23

## 2021-07-09 ENCOUNTER — HISTORICAL (OUTPATIENT)
Dept: HEMATOLOGY/ONCOLOGY | Facility: CLINIC | Age: 66
End: 2021-07-09

## 2021-07-09 LAB
ABS NEUT (OLG): 3.74 X10(3)/MCL (ref 2.1–9.2)
ALBUMIN SERPL-MCNC: 3.7 GM/DL (ref 3.4–4.8)
ALBUMIN/GLOB SERPL: 1.3 RATIO (ref 1.1–2)
ALP SERPL-CCNC: 91 UNIT/L (ref 40–150)
ALT SERPL-CCNC: 17 UNIT/L (ref 0–55)
AST SERPL-CCNC: 20 UNIT/L (ref 5–34)
BASOPHILS # BLD AUTO: 0 X10(3)/MCL (ref 0–0.2)
BASOPHILS NFR BLD AUTO: 0.5 %
BILIRUB SERPL-MCNC: 0.7 MG/DL
BILIRUBIN DIRECT+TOT PNL SERPL-MCNC: 0.3 MG/DL (ref 0–0.5)
BILIRUBIN DIRECT+TOT PNL SERPL-MCNC: 0.4 MG/DL (ref 0–0.8)
BUN SERPL-MCNC: 11.1 MG/DL (ref 9.8–20.1)
CALCIUM SERPL-MCNC: 7.4 MG/DL (ref 8.4–10.2)
CEA SERPL-MCNC: <1.73 NG/ML (ref 0–3)
CHLORIDE SERPL-SCNC: 102 MMOL/L (ref 98–107)
CO2 SERPL-SCNC: 28 MMOL/L (ref 23–31)
CREAT SERPL-MCNC: 0.79 MG/DL (ref 0.55–1.02)
EOSINOPHIL # BLD AUTO: 0 X10(3)/MCL (ref 0–0.9)
EOSINOPHIL NFR BLD AUTO: 0.8 %
ERYTHROCYTE [DISTWIDTH] IN BLOOD BY AUTOMATED COUNT: 13.9 % (ref 11.5–17)
GLOBULIN SER-MCNC: 2.9 GM/DL (ref 2.4–3.5)
GLUCOSE SERPL-MCNC: 149 MG/DL (ref 82–115)
HCT VFR BLD AUTO: 41.9 % (ref 37–47)
HGB BLD-MCNC: 13.8 GM/DL (ref 12–16)
LYMPHOCYTES # BLD AUTO: 2.2 X10(3)/MCL (ref 0.6–4.6)
LYMPHOCYTES NFR BLD AUTO: 34.2 %
MCH RBC QN AUTO: 28.9 PG (ref 27–31)
MCHC RBC AUTO-ENTMCNC: 32.9 GM/DL (ref 33–36)
MCV RBC AUTO: 87.8 FL (ref 80–94)
MONOCYTES # BLD AUTO: 0.3 X10(3)/MCL (ref 0.1–1.3)
MONOCYTES NFR BLD AUTO: 5.2 %
NEUTROPHILS # BLD AUTO: 3.7 X10(3)/MCL (ref 2.1–9.2)
NEUTROPHILS NFR BLD AUTO: 59.1 %
PLATELET # BLD AUTO: 268 X10(3)/MCL (ref 130–400)
PMV BLD AUTO: 10.1 FL (ref 9.4–12.4)
POTASSIUM SERPL-SCNC: 3.9 MMOL/L (ref 3.5–5.1)
PROT SERPL-MCNC: 6.6 GM/DL (ref 5.8–7.6)
RBC # BLD AUTO: 4.77 X10(6)/MCL (ref 4.2–5.4)
SODIUM SERPL-SCNC: 141 MMOL/L (ref 136–145)
WBC # SPEC AUTO: 6.3 X10(3)/MCL (ref 4.5–11.5)

## 2021-11-22 ENCOUNTER — HISTORICAL (OUTPATIENT)
Dept: ADMINISTRATIVE | Facility: HOSPITAL | Age: 66
End: 2021-11-22

## 2021-11-22 LAB
ALBUMIN SERPL-MCNC: 4 GM/DL (ref 3.4–4.8)
ALBUMIN/GLOB SERPL: 1.6 RATIO (ref 1.1–2)
ALP SERPL-CCNC: 80 UNIT/L (ref 40–150)
ALT SERPL-CCNC: 20 UNIT/L (ref 0–55)
AST SERPL-CCNC: 23 UNIT/L (ref 5–34)
BILIRUB SERPL-MCNC: 0.9 MG/DL
BILIRUBIN DIRECT+TOT PNL SERPL-MCNC: 0.3 MG/DL (ref 0–0.5)
BILIRUBIN DIRECT+TOT PNL SERPL-MCNC: 0.6 MG/DL (ref 0–0.8)
BUN SERPL-MCNC: 13.6 MG/DL (ref 9.8–20.1)
CALCIUM SERPL-MCNC: 8.3 MG/DL (ref 8.7–10.5)
CHLORIDE SERPL-SCNC: 103 MMOL/L (ref 98–107)
CHOLEST SERPL-MCNC: 185 MG/DL
CHOLEST/HDLC SERPL: 3 {RATIO} (ref 0–5)
CO2 SERPL-SCNC: 28 MMOL/L (ref 23–31)
CREAT SERPL-MCNC: 0.79 MG/DL (ref 0.55–1.02)
GLOBULIN SER-MCNC: 2.5 GM/DL (ref 2.4–3.5)
GLUCOSE SERPL-MCNC: 93 MG/DL (ref 82–115)
HDLC SERPL-MCNC: 65 MG/DL (ref 35–60)
LDLC SERPL CALC-MCNC: 99 MG/DL (ref 50–140)
POTASSIUM SERPL-SCNC: 3.9 MMOL/L (ref 3.5–5.1)
PROT SERPL-MCNC: 6.5 GM/DL (ref 5.8–7.6)
SODIUM SERPL-SCNC: 143 MMOL/L (ref 136–145)
TRIGL SERPL-MCNC: 105 MG/DL (ref 37–140)
TSH SERPL-ACNC: 1.47 UIU/ML (ref 0.35–4.94)
VLDLC SERPL CALC-MCNC: 21 MG/DL

## 2022-01-19 ENCOUNTER — HISTORICAL (OUTPATIENT)
Dept: HEMATOLOGY/ONCOLOGY | Facility: CLINIC | Age: 67
End: 2022-01-19

## 2022-01-19 ENCOUNTER — HISTORICAL (OUTPATIENT)
Dept: RADIOLOGY | Facility: HOSPITAL | Age: 67
End: 2022-01-19

## 2022-01-19 LAB
ABS NEUT (OLG): 3.95 X10(3)/MCL (ref 2.1–9.2)
ALBUMIN SERPL-MCNC: 4.3 GM/DL (ref 3.4–4.8)
ALBUMIN/GLOB SERPL: 1.5 RATIO (ref 1.1–2)
ALP SERPL-CCNC: 86 UNIT/L (ref 40–150)
ALT SERPL-CCNC: 26 UNIT/L (ref 0–55)
AST SERPL-CCNC: 28 UNIT/L (ref 5–34)
BASOPHILS # BLD AUTO: 0 X10(3)/MCL (ref 0–0.2)
BASOPHILS NFR BLD AUTO: 0.4 %
BILIRUB SERPL-MCNC: 0.7 MG/DL
BILIRUBIN DIRECT+TOT PNL SERPL-MCNC: 0.3 MG/DL (ref 0–0.5)
BILIRUBIN DIRECT+TOT PNL SERPL-MCNC: 0.4 MG/DL (ref 0–0.8)
BUN SERPL-MCNC: 11.4 MG/DL (ref 9.8–20.1)
CALCIUM SERPL-MCNC: 8 MG/DL (ref 8.7–10.5)
CEA SERPL-MCNC: <1.73 NG/ML (ref 0–3)
CHLORIDE SERPL-SCNC: 99 MMOL/L (ref 98–107)
CO2 SERPL-SCNC: 29 MMOL/L (ref 23–31)
CREAT SERPL-MCNC: 0.83 MG/DL (ref 0.55–1.02)
EOSINOPHIL # BLD AUTO: 0.1 X10(3)/MCL (ref 0–0.9)
EOSINOPHIL NFR BLD AUTO: 1.2 %
ERYTHROCYTE [DISTWIDTH] IN BLOOD BY AUTOMATED COUNT: 13.1 % (ref 11.5–17)
GLOBULIN SER-MCNC: 2.8 GM/DL (ref 2.4–3.5)
GLUCOSE SERPL-MCNC: 131 MG/DL (ref 82–115)
HCT VFR BLD AUTO: 45.9 % (ref 37–47)
HGB BLD-MCNC: 15 GM/DL (ref 12–16)
LYMPHOCYTES # BLD AUTO: 2.3 X10(3)/MCL (ref 0.6–4.6)
LYMPHOCYTES NFR BLD AUTO: 33.8 %
MCH RBC QN AUTO: 29.4 PG (ref 27–31)
MCHC RBC AUTO-ENTMCNC: 32.7 GM/DL (ref 33–36)
MCV RBC AUTO: 89.8 FL (ref 80–94)
MONOCYTES # BLD AUTO: 0.4 X10(3)/MCL (ref 0.1–1.3)
MONOCYTES NFR BLD AUTO: 6.2 %
NEUTROPHILS # BLD AUTO: 4 X10(3)/MCL (ref 2.1–9.2)
NEUTROPHILS NFR BLD AUTO: 58.3 %
PLATELET # BLD AUTO: 302 X10(3)/MCL (ref 130–400)
PMV BLD AUTO: 9.8 FL (ref 9.4–12.4)
POTASSIUM SERPL-SCNC: 4 MMOL/L (ref 3.5–5.1)
PROT SERPL-MCNC: 7.1 GM/DL (ref 5.8–7.6)
RBC # BLD AUTO: 5.11 X10(6)/MCL (ref 4.2–5.4)
SODIUM SERPL-SCNC: 141 MMOL/L (ref 136–145)
WBC # SPEC AUTO: 6.8 X10(3)/MCL (ref 4.5–11.5)

## 2022-01-26 ENCOUNTER — HISTORICAL (OUTPATIENT)
Dept: HEMATOLOGY/ONCOLOGY | Facility: CLINIC | Age: 67
End: 2022-01-26

## 2022-02-10 LAB — HUMAN PAPILLOMAVIRUS (HPV): NORMAL

## 2022-04-10 ENCOUNTER — HISTORICAL (OUTPATIENT)
Dept: ADMINISTRATIVE | Facility: HOSPITAL | Age: 67
End: 2022-04-10
Payer: MEDICARE

## 2022-04-24 VITALS
HEIGHT: 66 IN | SYSTOLIC BLOOD PRESSURE: 128 MMHG | OXYGEN SATURATION: 97 % | DIASTOLIC BLOOD PRESSURE: 78 MMHG | WEIGHT: 163.56 LBS | BODY MASS INDEX: 26.29 KG/M2

## 2022-04-30 NOTE — PROGRESS NOTES
Patient:   Carmen Oh            MRN: [id number]            FIN: [id number]               Age:   66 years     Sex:  Female     :  1955   Associated Diagnoses:   None   Author:   Antonio Willams MD      Visit Information   Diagnosis: Stage IA right breast cancer 4/15 (T1a N0 M0), 0.4 cm, grade III, ER/UT +, HER-2 +      Postmenopausal with intact uterus                  Heterozygous MUTYH mutation                  + COVID-19 vaccination    Current Treatment: Observation  Treatment History: MammoSite XRT completed 5/1/15; adjuvant Herceptin x 1 year completed ; Femara x 5 yrs (completed )    Clinical History: Patient presented with an abnormal annual mammogram 3/23/15 showing a suspicious lesion at the 3 o'clock position of the right breast.  Stereotactic biopsy 3/31/15 was positive for invasive ductal carcinoma.  She underwent a lumpectomy and sentinel lymph node dissection on the right 4/15/15.  Final pathology showed a 0.4 cm grade 3 infiltrating ductal carcinoma with focal mucinous features.  There was also DCIS present.  Resection margins were clear.  6 sentinel lymph nodes were negative for involvement.  ER +94%, UT +82% and HER-2 positive for overexpression by IHC and FISH.  Following her surgery, she underwent MammoSite radiation therapy to the right breast completing treatment 5/1/15.  She tolerated therapy well without any significant complications.      She was evaluated 5/4/15 for a Medical Oncology opinion.  Treatment options included: 1)TCH x 4 cycles with Herceptin continuing for one year, 2) single agent Herceptin for one year, or 3) no systemic adjuvant treatment.  Patient opted for adjuvant treatment with single agent Herceptin for 1 year followed by adjuvant hormonal therapy with an aromatase inhibitor.  She received her first cycle of Herceptin 5/18/15.  Baseline echocardiogram revealed normal left ventricular function with an ejection fraction of 55%.  There was no  diastolic dysfunction.  Echocardiogram 12/28/15 revealed normal LV function with an EF of 55-60%.  There was impaired relaxation compatible with diastolic dysfunction, trace mitral regurgitation and mild (1+) tricuspid regurgitation.  Repeat echocardiogram 3/24/16 showed a normal ejection fraction of 50% with mild mitral regurgitation and 2-3+ tricuspid regurgitation.  She was evaluated by cardiology who reviewed her echocardiogram and found her to be asymptomatic.  No additional treatment was indicated and follow-up echocardiogram was recommended in one year.  She completed 1 year of Herceptin therapy 5/6/16.    She underwent genetic testing by Dr. Trevino which revealed a heterozygous MUTYH mutation which is associated with an increased risk of colon cancer.  She had a colonoscopy in 2015 which revealed a single small polyp which was removed.  Follow-up exam was recommended in 5 years.  She had symptoms of vaginal atrophy and was started on low-dose vaginal estrogen therapy by her gynecologist with improvement in her symptoms. She had an EGD 10/4/18 which showed evidence of reflux esophagitis and mild erythema in the stomach. She was changed from Protonix to Ranitidine with improvement in her symptoms. She completed 5 years of adjuvant hormonal therapy with Femara 7/20.    She had a surveillance colonoscopy 11/24/20 showing a single 3-4 mm polyp in the ascending colon which was a tubular adenoma. Follow-up exam was recommended in 7 years. Bone density exam 7/19/19 showed normal bone density of the lumbar spine and both hips.      Chief Complaint   I'm doing well      Interval History   She returns to clinic today by herself for a 6-month surveillance visit.  She continues to feel well.  She has had no recent illnesses or problems.  She has no signs or symptoms suspicious for disease recurrence.  She reports no changes on her self breast examination.  Bilateral mammogram 6/16/21 ([facility]) showed  stable postsurgical and treatment changes on the right with no new or suspicious findings.  She had a bone density exam 1/19/22 showing normal bone density of both hips and femoral necks.  She would like to schedule her next mammogram at the [facility] breast [place].  Laboratory testing for this visit showed no significant abnormalities.      Review of Systems   Constitutional:  No fever, No fatigue, No decreased activity.    Eye:  No icterus, No blurring, No double vision.    Ear/Nose/Mouth/Throat:  No nasal congestion, No sore throat.    Respiratory:  No shortness of breath, No cough, No sputum production, No wheezing.    Cardiovascular:  No chest pain, No palpitations, No tachycardia.    Breast:  No changes on self breast examination.    Gastrointestinal:  No nausea, No vomiting, No diarrhea, No constipation, No heartburn, No abdominal pain.    Genitourinary:  No dysuria, No hematuria, No change in urine stream.    Hematology/Lymphatics:  No bruising tendency, No bleeding tendency, No swollen lymph glands.    Musculoskeletal:  Joint pain, No lower extremity edema, No back pain.    Integumentary:  No rash, No pruritus, No skin lesion.    Neurologic:  Alert and oriented X4, No abnormal balance, No confusion, No numbness, No tingling, No headache.    Psychiatric:  No anxiety, No depression.       Health Status   Current medications:  (Selected)   Prescriptions  Prescribed  Lipitor 40 mg oral tablet: 40 mg = 1 tab(s), Oral, At Bedtime, # 90 tab(s), 4 Refill(s), Pharmacy: [id number], 167, cm, Height/Length Dosing, 03/10/21 15:34:00 CST, 74.4, kg, Weight Dosing, 03/10/21 15:34:00 CST  Protonix 40 mg ORAL enteric coated tablet: 40 mg = 1 tab(s), Oral, Daily, # 90 tab(s), 1 Refill(s), Pharmacy: [id number], 167, cm, Height/Length Dosing, 11/23/21 14:44:00 CST, 74.2, kg, Weight Dosing, 11/23/21 14:44:00 CST  calcitriol 0.5 mcg oral capsule: See Instructions, Take 1 capsule by  mouth once daily, # 90 cap(s), 4 Refill(s), Pharmacy: [id number], 167, cm, Height/Length Dosing, 21 9:23:00 CST, 77.9, kg, Weight Dosing, 21 9:23:00 CST  hydrochlorothiazide 25 mg oral tablet: See Instructions, Take 1 tablet by mouth once daily, # 90 tab(s), 3 Refill(s), Pharmacy: [id number], 167, cm, Height/Length Dosing, 21 14:44:00 CST, 74.2, kg, Weight Dosing, 21 14:44:00 CST  levothyroxine 100 mcg (0.1 mg) oral tablet: 100 mcg = 1 tab(s), Oral, Daily, # 90 tab(s), 4 Refill(s), Pharmacy: [id number], 167, cm, Height/Length Dosing, 03/10/21 15:34:00 CST, 74.4, kg, Weight Dosing, 03/10/21 15:34:00 CST  Documented Medications  Documented  Caltrate: 1 caps with bkfst and 1 caps with supper, 0 Refill(s)  Vitamin C 100 mg oral tablet, chewable: 100 mg = 1 tab(s), Chewed, Daily, # 30 tab(s), 0 Refill(s)      Histories   Past Medical History:    Active  Hypocalcemia (0922612)  Breast cancer ([id number])  Resolved  Hyperthyroidism (68557617):  Resolved.  Primary malignant neoplasm of breast upper inner quadrant ([id number]):  Resolved.   Family History:    ATRIAL FIBRILLATION  Mother  Heart disease  Father ()  Primary malignant neoplasm of prostate  Brother  Cervical cancer....  Other (Maternal aunt)  Aneurysm.  Sister     Procedure history:    Colonoscopy in the month of 2015 at 60 Years.  Right breast biopsy in the month of 2015 at 59 Years.  Right breast lumpectomy in the month of 2015 at 59 Years.  Thyroidectomy in  at 53 Years.   section () in  at 31 Years.   section () in  at 23 Years.      Gynecologic history   Menstrual cycle: Menarche age 12, first pregnancy 23, did not breast-feed, menopause 51, no HRT.   Social History        Social & Psychosocial Habits    Alcohol  2021  Use: Current    Frequency: 1-2 times per month    Employment/School  2015  Status: Employed    Description:  [employer] at [employer]    Exercise  08/16/2017  Times per week: 1-2 times/week    Exercise type: Walking    Home/Environment  05/01/2015  Lives with: Spouse    Nutrition/Health  08/16/2017  Type of diet: Regular    Sexual  08/16/2017  Sexually active: No    Substance Use  05/04/2015 Risk Assessment: Denies Substance Abuse    04/21/2021  Use: Never    Tobacco  07/19/2021  Use: Never (less than 100 in l    Patient Wants Consult For Cessation Counseling No    Abuse/Neglect  07/19/2021  SHX Any signs of abuse or neglect No  .        Physical Examination   Vital Signs   1/26/2022 9:15 CST       Temperature Oral          36.7 DegC                             Temperature Oral (calculated)             98.06 DegF                             Peripheral Pulse Rate     73 bpm                             Systolic Blood Pressure   135 mmHg                             Diastolic Blood Pressure  86 mmHg                             Blood Pressure Location   Left arm                             Manual Cuff BP            No     General:  Alert and oriented, Well-developed white female in no acute distress.    Eye:  Pupils are equal, round and reactive to light, Extraocular movements are intact, Normal conjunctiva.    HENT:  Normocephalic, Oral mucosa is moist, No pharyngeal erythema.    Neck:  Supple, Non-tender, No lymphadenopathy.    Respiratory:  Lungs clear to auscultation.    Breast:  Well-healed right periareolar and right axillary incisions; stable masslike thickening at 1:00 at site of previous MammoSite radiation. No suspicious masses, skin changes or axillary nodes bilaterally.    Cardiovascular:  Normal rate, Regular rhythm, No murmur.    Gastrointestinal:  Soft, Non-tender, Non-distended, Normal bowel sounds, No palpable masses or organomegaly.    Lymphatics:  No lymphadenopathy neck, axilla, groin.    Musculoskeletal:  Normal range of motion, No tenderness, No lower extremity edema.    Integumentary:  Warm,  Dry, No rash.    Neurologic:  Alert, Oriented, Normal sensory, Normal motor function, No focal deficits, Cranial Nerves II-XII are grossly intact.    ECOG Performance Scale: 0 - Fully active; no performance restrictions.      Review / Management   Laboratory Results   Last 10 Days Lab Results : PowerNote Discrete Results   1/19/2022 11:22 CST      Breast Carcinoma Assoc Ag(CA 27.29)-MO    <12.0    1/19/2022 8:26 CST       WBC                       6.8 x10(3)/mcL                             RBC                       5.11 x10(6)/mcL                             Hgb                       15.0 gm/dL                             Hct                       45.9 %                             Platelet                  302 x10(3)/mcL                             MCV                       89.8 fL                             MCH                       29.4 pg                             MCHC                      32.7 gm/dL  LOW                             RDW                       13.1 %                             MPV                       9.8 fL                             Abs Neut                  3.95 x10(3)/mcL                             NEUT%                     58.3 %  NA                             NEUT#                     4.0 x10(3)/mcL                             LYMPH%                    33.8 %  NA                             LYMPH#                    2.3 x10(3)/mcL                             MONO%                     6.2 %  NA                             MONO#                     0.4 x10(3)/mcL                             EOS%                      1.2 %  NA                             EOS#                      0.1 x10(3)/mcL                             BASO%                     0.4 %  NA                             BASO#                     0.0 x10(3)/mcL                             Sodium Lvl                141 mmol/L                             Potassium Lvl             4.0 mmol/L                              Chloride                  99 mmol/L                             CO2                       29 mmol/L                             Calcium Lvl               8.0 mg/dL  LOW                             Glucose Lvl               131 mg/dL  HI                             BUN                       11.4 mg/dL                             Creatinine                0.83 mg/dL                             eGFR-AA                   >60  NA                             eGFR-ELYSE                  >60 mL/min/1.73 m2  NA                             Bili Total                0.7 mg/dL                             Bili Direct               0.3 mg/dL                             Bili Indirect             0.40 mg/dL                             AST                       28 unit/L                             ALT                       26 unit/L                             Alk Phos                  86 unit/L                             Total Protein             7.1 gm/dL                             Albumin Lvl               4.3 gm/dL                             Globulin                  2.8 gm/dL                             A/G Ratio                 1.5 ratio                             CEA                       <1.73 ng/mL           Impression and Plan   IMPRESSION:  Stage IA breast cancer - HAROLDO    PLAN:  Patient continues to do well with no clinical findings suspicious for disease recurrence.   Bone density was normal.  She will be scheduled for a bilateral mammogram/tomosynthesis in June, 2022.  RTC after the mammogram for a follow-up visit and clinical exam with repeat laboratory.      DENICE MARTINEZ MD    Other Physicians  Dr. Trinidad Herman

## 2022-05-31 ENCOUNTER — TELEPHONE (OUTPATIENT)
Dept: INTERNAL MEDICINE | Facility: CLINIC | Age: 67
End: 2022-05-31
Payer: MEDICARE

## 2022-05-31 NOTE — TELEPHONE ENCOUNTER
----- Message from Corby Gross MA sent at 5/31/2022  8:12 AM CDT -----  Regarding: PV 6/6/22 @ 11:00 Dr. Geronimo  1. Are there any outstanding tasks in the patient's chart? Yes, fasting labs    2. Is there any documentation in the chart? No    3.Has patient been seen in an ER, Urgent care clinic, or been admitted since last visit?  If yes, When, where, and why    4. Has patient seen any other healthcare providers since last visit?  If yes, when, where, and why    5. Has patient had any bloodwork or XR done since last visit?    6. Is patient signed up for patient portal?

## 2022-06-01 DIAGNOSIS — E78.5 HYPERLIPIDEMIA, UNSPECIFIED HYPERLIPIDEMIA TYPE: ICD-10-CM

## 2022-06-01 DIAGNOSIS — E83.51 HYPOCALCEMIA: Primary | ICD-10-CM

## 2022-06-21 DIAGNOSIS — C50.211 MALIGNANT NEOPLASM OF UPPER-INNER QUADRANT OF RIGHT FEMALE BREAST, UNSPECIFIED ESTROGEN RECEPTOR STATUS: Primary | ICD-10-CM

## 2022-06-29 ENCOUNTER — HOSPITAL ENCOUNTER (OUTPATIENT)
Dept: RADIOLOGY | Facility: HOSPITAL | Age: 67
Discharge: HOME | End: 2022-06-29
Attending: INTERNAL MEDICINE
Payer: MEDICARE

## 2022-06-29 DIAGNOSIS — Z12.31 ENCOUNTER FOR SCREENING MAMMOGRAM FOR MALIGNANT NEOPLASM OF BREAST: ICD-10-CM

## 2022-06-29 PROCEDURE — 77067 MAMMO DIGITAL SCREENING BILAT WITH TOMO: ICD-10-PCS | Mod: 26,,, | Performed by: RADIOLOGY

## 2022-06-29 PROCEDURE — 77063 MAMMO DIGITAL SCREENING BILAT WITH TOMO: ICD-10-PCS | Mod: 26,,, | Performed by: RADIOLOGY

## 2022-06-29 PROCEDURE — 77067 SCR MAMMO BI INCL CAD: CPT | Mod: 26,,, | Performed by: RADIOLOGY

## 2022-06-29 PROCEDURE — 77067 SCR MAMMO BI INCL CAD: CPT | Mod: TC

## 2022-06-29 PROCEDURE — 77063 BREAST TOMOSYNTHESIS BI: CPT | Mod: 26,,, | Performed by: RADIOLOGY

## 2022-07-15 RX ORDER — HYDROCHLOROTHIAZIDE 25 MG/1
25 TABLET ORAL DAILY
COMMUNITY
Start: 2022-04-01 | End: 2022-12-22 | Stop reason: SDUPTHER

## 2022-07-15 RX ORDER — ATORVASTATIN CALCIUM 40 MG/1
40 TABLET, FILM COATED ORAL NIGHTLY
COMMUNITY
Start: 2022-05-23 | End: 2023-06-01 | Stop reason: SDUPTHER

## 2022-07-15 NOTE — PROGRESS NOTES
Subjective:       Patient ID: Carmen Oh is a 67 y.o. female.    Chief Complaint: No complaints, here for breast cancer follow-up    Diagnosis: Stage IA right breast cancer 4/15 (T1a N0 M0), 0.4 cm, grade III, ER/WV +, HER-2 +      Postmenopausal with intact uterus                  Heterozygous MUTYH mutation                  + COVID-19 vaccination    Current Treatment: Observation  Treatment History: MammoSite XRT completed 5/1/15; adjuvant Herceptin x 1 year completed 5/16; Femara x 5 yrs (completed 7/20)    Clinical History: Patient presented with an abnormal annual mammogram 3/23/15 showing a suspicious lesion at the 3 o'clock position of the right breast.  Stereotactic biopsy 3/31/15 was positive for invasive ductal carcinoma.  She underwent a lumpectomy and sentinel lymph node dissection on the right 4/15/15.  Final pathology showed a 0.4 cm grade 3 infiltrating ductal carcinoma with focal mucinous features.  There was also DCIS present.  Resection margins were clear.  6 sentinel lymph nodes were negative for involvement.  ER +94%, WV +82% and HER-2 positive for overexpression by IHC and FISH.  Following her surgery, she underwent MammoSite radiation therapy to the right breast completing treatment 5/1/15.  She tolerated therapy well without any significant complications.      She was evaluated 5/4/15 for a Medical Oncology opinion.  Treatment options included: 1)TCH x 4 cycles with Herceptin continuing for one year, 2) single agent Herceptin for one year, or 3) no systemic adjuvant treatment.  Patient opted for adjuvant treatment with single agent Herceptin for 1 year followed by adjuvant hormonal therapy with an aromatase inhibitor.  She received her first cycle of Herceptin 5/18/15.  Baseline echocardiogram revealed normal left ventricular function with an ejection fraction of 55%.  There was no diastolic dysfunction.  Echocardiogram 12/28/15 revealed normal LV function with an EF of 55-60%.  There  was impaired relaxation compatible with diastolic dysfunction, trace mitral regurgitation and mild (1+) tricuspid regurgitation.  Repeat echocardiogram 3/24/16 showed a normal ejection fraction of 50% with mild mitral regurgitation and 2-3+ tricuspid regurgitation.  She was evaluated by cardiology who reviewed her echocardiogram and found her to be asymptomatic.  No additional treatment was indicated and follow-up echocardiogram was recommended in one year.  She completed 1 year of Herceptin therapy 5/6/16.    She underwent genetic testing by Dr. Trevino which revealed a heterozygous MUTYH mutation which is associated with an increased risk of colon cancer.  She had a colonoscopy in 2015 which revealed a single small polyp which was removed.  Follow-up exam was recommended in 5 years.  She had symptoms of vaginal atrophy and was started on low-dose vaginal estrogen therapy by her gynecologist with improvement in her symptoms. She had an EGD 10/4/18 which showed evidence of reflux esophagitis and mild erythema in the stomach. She was changed from Protonix to Ranitidine with improvement in her symptoms. She completed 5 years of adjuvant hormonal therapy with Femara 7/20.    She had a surveillance colonoscopy 11/24/20 showing a single 3-4 mm polyp in the ascending colon which was a tubular adenoma. Follow-up exam was recommended in 7 years. Bone density exam 1/19/22 showed normal bone density of both hips and femoral necks.       Interval History   Ms. Oh is here today by herself for a six month breast cancer surveillance visit.  She feels well and has no .  She denies any recent illnesses or procedures.  He denies any changes to her self breast examination.  She is now over 7 years out from her diagnosis and right lumpectomy.  Bilateral screening mammogram with tomosynthesis([facility])   07/07/2022 was unremarkable.  Laboratory 6/29/22 was normal, including LFTs and serum tumor markers.  These findings were reviewed  with the patient.  All health maintenance is up-to-date.    Review of patient's allergies indicates:  No Known Allergies   Review of Systems   Constitutional: Negative.    HENT: Negative.    Eyes: Negative.    Respiratory: Negative for cough, chest tightness and shortness of breath.    Cardiovascular: Negative for chest pain and leg swelling.   Gastrointestinal: Negative for abdominal pain, blood in stool, change in bowel habit and change in bowel habit.   Endocrine: Negative.    Genitourinary: Negative.    Integumentary:  Negative for breast mass and breast tenderness.   Allergic/Immunologic: Negative.    Neurological: Negative.    Hematological: Negative for adenopathy.   Psychiatric/Behavioral: Negative.    Breast: Negative for mass and tenderness        Past medical history:  Breast cancer, hyperthyroidism, hypocalcemia    Past surgical history:  Right lumpectomy, thyroidectomy,     Family history:  Brother had prostate cancer    Social history:  Social alcohol use, lifetime nonsmoker    Objective:     Vitals:    22 1420   BP: 123/76   Pulse: 64   Resp: 18   Temp: 97 °F (36.1 °C)         Physical Exam  Constitutional:       Appearance: Normal appearance.   HENT:      Head: Normocephalic.      Nose: Nose normal.      Mouth/Throat:      Mouth: Mucous membranes are moist.      Pharynx: No oropharyngeal exudate.   Eyes:      Conjunctiva/sclera: Conjunctivae normal.      Pupils: Pupils are equal, round, and reactive to light.   Cardiovascular:      Rate and Rhythm: Normal rate and regular rhythm.   Pulmonary:      Effort: Pulmonary effort is normal.      Breath sounds: Normal breath sounds.   Chest:   Breasts:      Right: No axillary adenopathy or supraclavicular adenopathy.      Left: No axillary adenopathy or supraclavicular adenopathy.        Comments: Well-healed right periareolar and right axillary incisions.  Stable masslike thickening at 1:00 right breast at the site of previous  MammoSite  Abdominal:      General: Bowel sounds are normal.      Palpations: Abdomen is soft.   Musculoskeletal:         General: Normal range of motion.      Cervical back: Normal range of motion.   Lymphadenopathy:      Cervical: No cervical adenopathy.      Upper Body:      Right upper body: No supraclavicular or axillary adenopathy.      Left upper body: No supraclavicular or axillary adenopathy.   Skin:     General: Skin is warm.   Neurological:      General: No focal deficit present.      Mental Status: She is alert and oriented to person, place, and time.   Psychiatric:         Mood and Affect: Mood normal.         Behavior: Behavior normal.       ECOG SCORE    0 - Fully active-able to carry on all pre-disease performance without restriction            Lab Results   Component Value Date/Time    WBC 5.7 06/29/2022 08:19 AM    RBC 5.06 06/29/2022 08:19 AM    HGB 14.5 06/29/2022 08:19 AM    HCT 44.7 06/29/2022 08:19 AM     06/29/2022 08:19 AM    ABSNEUTRO 3.0 06/29/2022 08:19 AM    NEUTROAUTO 52.6 06/29/2022 08:19 AM    ABSLYMPH 2.13 06/29/2022 08:19 AM    LYMPHAUTO 37.6 06/29/2022 08:19 AM    ABSEOS 0.06 06/29/2022 08:19 AM    EOSAUTO 1.1 06/29/2022 08:19 AM    ABSBASO 0.03 06/29/2022 08:19 AM    BASOPHILAUTO 0.5 06/29/2022 08:19 AM        Chemistry        Component Value Date/Time     06/29/2022 0819    K 4.6 06/29/2022 0819    CO2 30 06/29/2022 0819    BUN 14.3 06/29/2022 0819    CREATININE 0.82 06/29/2022 0819        Component Value Date/Time    CALCIUM 8.6 06/29/2022 0819    ALKPHOS 89 06/29/2022 0819    AST 22 06/29/2022 0819    ALT 17 06/29/2022 0819    BILITOT 0.7 06/29/2022 0819    EGFRNONAA >60 06/29/2022 0819         Tumor markers normal and reviewed  Assessment:   Stage I A breast cancer        Plan:   Patient is doing well with no evidence of disease recurrence or new problems.  She will conduct breast cancer surveillance on an annual basis moving forward in conjunction with her  mammogram.  Bilateral mammogram due 7/23.   RTC after above.  She will also have CBC, CMP, CEA and CA 27-29 prior to visit.  Patient in agreement.  All questions answered to her satisfaction.    JUANI CARSON, FNP-C    Other Physicians  Dr. Trinidad Herman

## 2022-07-18 ENCOUNTER — OFFICE VISIT (OUTPATIENT)
Dept: HEMATOLOGY/ONCOLOGY | Facility: CLINIC | Age: 67
End: 2022-07-18
Payer: MEDICARE

## 2022-07-18 VITALS
WEIGHT: 160.06 LBS | RESPIRATION RATE: 18 BRPM | HEART RATE: 64 BPM | BODY MASS INDEX: 25.72 KG/M2 | HEIGHT: 66 IN | DIASTOLIC BLOOD PRESSURE: 76 MMHG | SYSTOLIC BLOOD PRESSURE: 123 MMHG | OXYGEN SATURATION: 98 % | TEMPERATURE: 97 F

## 2022-07-18 DIAGNOSIS — Z12.31 ENCOUNTER FOR SCREENING MAMMOGRAM FOR MALIGNANT NEOPLASM OF BREAST: ICD-10-CM

## 2022-07-18 DIAGNOSIS — Z12.39 ENCOUNTER FOR SCREENING FOR MALIGNANT NEOPLASM OF BREAST, UNSPECIFIED SCREENING MODALITY: ICD-10-CM

## 2022-07-18 DIAGNOSIS — Z85.3 PERSONAL HISTORY OF BREAST CANCER: Primary | ICD-10-CM

## 2022-07-18 PROCEDURE — 99213 OFFICE O/P EST LOW 20 MIN: CPT | Mod: S$PBB,,, | Performed by: NURSE PRACTITIONER

## 2022-07-18 PROCEDURE — 99214 OFFICE O/P EST MOD 30 MIN: CPT | Mod: PBBFAC | Performed by: NURSE PRACTITIONER

## 2022-07-18 PROCEDURE — 99999 PR PBB SHADOW E&M-EST. PATIENT-LVL IV: ICD-10-PCS | Mod: PBBFAC,,, | Performed by: NURSE PRACTITIONER

## 2022-07-18 PROCEDURE — 99999 PR PBB SHADOW E&M-EST. PATIENT-LVL IV: CPT | Mod: PBBFAC,,, | Performed by: NURSE PRACTITIONER

## 2022-07-18 PROCEDURE — 99213 PR OFFICE/OUTPT VISIT, EST, LEVL III, 20-29 MIN: ICD-10-PCS | Mod: S$PBB,,, | Performed by: NURSE PRACTITIONER

## 2022-07-20 DIAGNOSIS — E55.9 VITAMIN D DEFICIENCY: ICD-10-CM

## 2022-07-20 DIAGNOSIS — E03.9 HYPOTHYROIDISM, UNSPECIFIED TYPE: ICD-10-CM

## 2022-07-20 DIAGNOSIS — I10 HYPERTENSION, UNSPECIFIED TYPE: Primary | ICD-10-CM

## 2022-07-20 DIAGNOSIS — E78.5 HYPERLIPIDEMIA, UNSPECIFIED HYPERLIPIDEMIA TYPE: ICD-10-CM

## 2022-09-06 RX ORDER — LEVOTHYROXINE SODIUM 100 UG/1
TABLET ORAL
Qty: 90 TABLET | Refills: 0 | Status: SHIPPED | OUTPATIENT
Start: 2022-09-06 | End: 2022-12-06

## 2022-09-07 NOTE — TELEPHONE ENCOUNTER
----- Message from Corby Gross MA sent at 9/7/2022  8:39 AM CDT -----  Regarding: PV 9/14/22 @ 10:00 Dr. Geronimo  1. Are there any outstanding tasks in the patient's chart? Yes, fasting labs    2. Is there any documentation in the chart? No    3.Has patient been seen in an ER, Urgent care clinic, or been admitted since last visit?  If yes, When, where, and why    4. Has patient seen any other healthcare providers since last visit?  If yes, when, where, and why    5. Has patient had any bloodwork or XR done since last visit?    6. Is patient signed up for patient portal?

## 2022-09-12 ENCOUNTER — LAB VISIT (OUTPATIENT)
Dept: LAB | Facility: HOSPITAL | Age: 67
End: 2022-09-12
Attending: INTERNAL MEDICINE
Payer: MEDICARE

## 2022-09-12 DIAGNOSIS — E55.9 VITAMIN D DEFICIENCY: ICD-10-CM

## 2022-09-12 DIAGNOSIS — E83.51 HYPOCALCEMIA: ICD-10-CM

## 2022-09-12 DIAGNOSIS — E03.9 HYPOTHYROIDISM, UNSPECIFIED TYPE: ICD-10-CM

## 2022-09-12 DIAGNOSIS — E78.5 HYPERLIPIDEMIA, UNSPECIFIED HYPERLIPIDEMIA TYPE: ICD-10-CM

## 2022-09-12 LAB
ALBUMIN SERPL-MCNC: 4 GM/DL (ref 3.4–4.8)
ALBUMIN/GLOB SERPL: 1.4 RATIO (ref 1.1–2)
ALP SERPL-CCNC: 97 UNIT/L (ref 40–150)
ALT SERPL-CCNC: 20 UNIT/L (ref 0–55)
APPEARANCE UR: CLEAR
AST SERPL-CCNC: 20 UNIT/L (ref 5–34)
BACTERIA #/AREA URNS AUTO: NORMAL /HPF
BASOPHILS # BLD AUTO: 0.04 X10(3)/MCL (ref 0–0.2)
BASOPHILS NFR BLD AUTO: 0.6 %
BILIRUB UR QL STRIP.AUTO: NEGATIVE MG/DL
BILIRUBIN DIRECT+TOT PNL SERPL-MCNC: 0.8 MG/DL
BUN SERPL-MCNC: 14.5 MG/DL (ref 9.8–20.1)
CALCIUM SERPL-MCNC: 8.2 MG/DL (ref 8.4–10.2)
CHLORIDE SERPL-SCNC: 102 MMOL/L (ref 98–107)
CHOLEST SERPL-MCNC: 177 MG/DL
CHOLEST/HDLC SERPL: 3 {RATIO} (ref 0–5)
CO2 SERPL-SCNC: 29 MMOL/L (ref 23–31)
COLOR UR AUTO: YELLOW
CREAT SERPL-MCNC: 0.75 MG/DL (ref 0.55–1.02)
DEPRECATED CALCIDIOL+CALCIFEROL SERPL-MC: 44.8 NG/ML (ref 30–80)
EOSINOPHIL # BLD AUTO: 0.1 X10(3)/MCL (ref 0–0.9)
EOSINOPHIL NFR BLD AUTO: 1.5 %
ERYTHROCYTE [DISTWIDTH] IN BLOOD BY AUTOMATED COUNT: 13.7 % (ref 11.5–17)
GFR SERPLBLD CREATININE-BSD FMLA CKD-EPI: >60 MLS/MIN/1.73/M2
GLOBULIN SER-MCNC: 2.9 GM/DL (ref 2.4–3.5)
GLUCOSE SERPL-MCNC: 98 MG/DL (ref 82–115)
GLUCOSE UR QL STRIP.AUTO: NEGATIVE MG/DL
HCT VFR BLD AUTO: 46.4 % (ref 37–47)
HDLC SERPL-MCNC: 59 MG/DL (ref 35–60)
HGB BLD-MCNC: 14.8 GM/DL (ref 12–16)
IMM GRANULOCYTES # BLD AUTO: 0.02 X10(3)/MCL (ref 0–0.04)
IMM GRANULOCYTES NFR BLD AUTO: 0.3 %
KETONES UR QL STRIP.AUTO: NEGATIVE MG/DL
LDLC SERPL CALC-MCNC: 97 MG/DL (ref 50–140)
LEUKOCYTE ESTERASE UR QL STRIP.AUTO: NEGATIVE UNIT/L
LYMPHOCYTES # BLD AUTO: 2.31 X10(3)/MCL (ref 0.6–4.6)
LYMPHOCYTES NFR BLD AUTO: 33.5 %
MCH RBC QN AUTO: 28.4 PG (ref 27–31)
MCHC RBC AUTO-ENTMCNC: 31.9 MG/DL (ref 33–36)
MCV RBC AUTO: 88.9 FL (ref 80–94)
MONOCYTES # BLD AUTO: 0.48 X10(3)/MCL (ref 0.1–1.3)
MONOCYTES NFR BLD AUTO: 7 %
NEUTROPHILS # BLD AUTO: 3.9 X10(3)/MCL (ref 2.1–9.2)
NEUTROPHILS NFR BLD AUTO: 57.1 %
NITRITE UR QL STRIP.AUTO: NEGATIVE
NRBC BLD AUTO-RTO: 0 %
PH UR STRIP.AUTO: 7 [PH]
PLATELET # BLD AUTO: 286 X10(3)/MCL (ref 130–400)
PMV BLD AUTO: 10.7 FL (ref 7.4–10.4)
POTASSIUM SERPL-SCNC: 3.8 MMOL/L (ref 3.5–5.1)
PROT SERPL-MCNC: 6.9 GM/DL (ref 5.8–7.6)
PROT UR QL STRIP.AUTO: NEGATIVE MG/DL
RBC # BLD AUTO: 5.22 X10(6)/MCL (ref 4.2–5.4)
RBC #/AREA URNS AUTO: <5 /HPF
RBC UR QL AUTO: NEGATIVE UNIT/L
SODIUM SERPL-SCNC: 141 MMOL/L (ref 136–145)
SP GR UR STRIP.AUTO: 1.01 (ref 1–1.03)
SQUAMOUS #/AREA URNS AUTO: <5 /HPF
TRIGL SERPL-MCNC: 106 MG/DL (ref 37–140)
TSH SERPL-ACNC: 1.62 UIU/ML (ref 0.35–4.94)
UROBILINOGEN UR STRIP-ACNC: 0.2 MG/DL
VLDLC SERPL CALC-MCNC: 21 MG/DL
WBC # SPEC AUTO: 6.9 X10(3)/MCL (ref 4.5–11.5)
WBC #/AREA URNS AUTO: <5 /HPF

## 2022-09-12 PROCEDURE — 85025 COMPLETE CBC W/AUTO DIFF WBC: CPT

## 2022-09-12 PROCEDURE — 81001 URINALYSIS AUTO W/SCOPE: CPT

## 2022-09-12 PROCEDURE — 82306 VITAMIN D 25 HYDROXY: CPT

## 2022-09-12 PROCEDURE — 84443 ASSAY THYROID STIM HORMONE: CPT

## 2022-09-12 PROCEDURE — 80061 LIPID PANEL: CPT

## 2022-09-12 PROCEDURE — 36415 COLL VENOUS BLD VENIPUNCTURE: CPT

## 2022-09-12 PROCEDURE — 80053 COMPREHEN METABOLIC PANEL: CPT

## 2022-09-14 ENCOUNTER — OFFICE VISIT (OUTPATIENT)
Dept: INTERNAL MEDICINE | Facility: CLINIC | Age: 67
End: 2022-09-14
Payer: MEDICARE

## 2022-09-14 VITALS
BODY MASS INDEX: 25.25 KG/M2 | HEART RATE: 83 BPM | HEIGHT: 66 IN | TEMPERATURE: 97 F | DIASTOLIC BLOOD PRESSURE: 80 MMHG | SYSTOLIC BLOOD PRESSURE: 124 MMHG | OXYGEN SATURATION: 97 % | WEIGHT: 157.13 LBS | RESPIRATION RATE: 16 BRPM

## 2022-09-14 DIAGNOSIS — C50.919 MALIGNANT NEOPLASM OF FEMALE BREAST, UNSPECIFIED ESTROGEN RECEPTOR STATUS, UNSPECIFIED LATERALITY, UNSPECIFIED SITE OF BREAST: ICD-10-CM

## 2022-09-14 DIAGNOSIS — Z00.00 ANNUAL PHYSICAL EXAM: Primary | ICD-10-CM

## 2022-09-14 DIAGNOSIS — Z78.0 POSTMENOPAUSAL: ICD-10-CM

## 2022-09-14 DIAGNOSIS — E03.9 HYPOTHYROIDISM, UNSPECIFIED TYPE: ICD-10-CM

## 2022-09-14 DIAGNOSIS — I10 HYPERTENSION, UNSPECIFIED TYPE: ICD-10-CM

## 2022-09-14 PROCEDURE — G0439 PR MEDICARE ANNUAL WELLNESS SUBSEQUENT VISIT: ICD-10-PCS | Mod: ,,, | Performed by: INTERNAL MEDICINE

## 2022-09-14 PROCEDURE — G0439 PPPS, SUBSEQ VISIT: HCPCS | Mod: ,,, | Performed by: INTERNAL MEDICINE

## 2022-09-14 NOTE — PROGRESS NOTES
Patient ID: [id number]     Chief Complaint: Medicare AWV      HPI:     Carmen Oh is a 67 y.o. female here today for a Medicare Wellness.     Dr. Willams/oncology-history of breast cancer; 7 year out; and is now going to be seen every July.   Dr. Camacho/endocrinology--thyroid lump one half of thyroid then had partial thyroidectomy.  Subsequently became hyperthyroid and put on PTU for 7 years then had thyroidectomy of other lobe.  Parathyroids not salvaged and patient now on calcium supplementation.  Dr. Herman/GYN, up-to-date with Pap and mammogram  Dr. Ornelas/GI, C scope in , GERD     Opioid Screening: Patient medication list reviewed, patient is not taking prescription opioids. Patient is not using additional opioids than prescribed. Patient is not at low risk of substance abuse based on this opioid use history.       ----------------------------  Hyperthyroidism  Hypocalcemia  R breast cancer     Past Surgical History:   Procedure Laterality Date    BREAST BIOPSY Right     BREAST LUMPECTOMY Right      SECTION      X 2    COLONOSCOPY      2015    THYROIDECTOMY         Review of patient's allergies indicates:  No Known Allergies    Outpatient Medications Marked as Taking for the 22 encounter (Office Visit) with Tacos Falcon MD   Medication Sig Dispense Refill    atorvastatin (LIPITOR) 40 MG tablet Take 40 mg by mouth nightly.      calcitRIOL (ROCALTROL) 0.5 MCG Cap Take 1 capsule by mouth once daily 90 capsule 0    hydroCHLOROthiazide (HYDRODIURIL) 25 MG tablet Take 25 mg by mouth once daily.      levothyroxine (SYNTHROID) 100 MCG tablet Take 1 tablet by mouth once daily 90 tablet 0    pantoprazole (PROTONIX) 40 MG tablet Take 1 tablet by mouth once daily 90 tablet 0       Social History     Socioeconomic History    Marital status:    Occupational History    Occupation: Cafeteria tech at [employer]   Tobacco Use    Smoking status: Never    Smokeless  tobacco: Never   Substance and Sexual Activity    Alcohol use: Yes     Comment: 1- 2 times/month    Drug use: Never    Sexual activity: Not Currently        Family History   Problem Relation Age of Onset    Atrial fibrillation Mother     Heart disease Father     Aneurysm Sister     Prostate cancer Brother     Cervical cancer Maternal Aunt         Patient Care Team:  Tacos Falcon MD as PCP - General (Internal Medicine)  CASSIE Ornelas MD as Consulting Physician (Gastroenterology)  Antonio Willams MD as Consulting Physician (Oncology)       Subjective:     ROS  Constitutional: No fever, no chills, no night sweats, no changes in weight, no changes in appetite.  Eye: No blurring of vision, no double vision, no conjunctival injection, no acute vision loss  ENMT: No trauma, No sore throat, no rhinorrhea, no tinnitus, no headache, no vertigo, no ear pain or discharge  Respiratory: No cough, no sputum production, no shortness of breath, no hemoptysis, no wheezing.  Cardiovascular: No chest pain, no PERKINS, no PND, no orthopnea, no edema, no palpitations.  Gastrointestinal: No abdominal pain, nausea, no vomiting, no changes in frequency or consistency of stools, no diarrhea, no constipation, no BRBPR.  Genitourinary: No dysuria, no hematuria, no foul-smelling urine, no straining to urinate, no increase in frequency  Heme/Lymph: No easy bruising/ bleeding, no swollen or painful glands.  Endocrine: No polyuria, no polydipsia, no polyphagia, no heat or cold intolerance.  Musculoskeletal: No muscle pain, no muscle weakness, no joint pain, no difficulties on reference to range of motion.  Integumentary: No rash, no pruritis.  Neurologic: No sensory deficit, no motor deficit, no headache, no neck rigidity, no paresthesias, no syncope.  Psychiatric: no mood changes, no anxiety, no depression, no tension, no memory defecits  All Other ROS: Negative with exception of what is documented in the history of present illness      Patient Reported Health Risk Assessment  What [age] your [age] 65-69  Are you male or female?: Female  During the past four weeks, how much have you been bothered by emotional problems such as feeling anxious, depressed, irritable, sad, or downhearted and blue?: Not at all  During the past five weeks, has your physical and/or emotional health limited your social activities with family, friends, neighbors, or groups?: Not at all  During the past four weeks, how much bodily pain have you generally had?: Very mild pain  During the past four weeks, was someone available to help if you needed and wanted help?: Yes, as much as I wanted  During the past four weeks, what was the hardest physical activity you could do for at least two minutes?: Heavy  Can you get to places out of walking distance without help?  (For example, can you travel alone on buses or taxis, or drive your own car?): Yes  Can you go shopping for groceries or clothes without someone's help?: Yes  Can you prepare your own meals?: Yes  Can you do your own housework without help?: Yes  Because of any health problems, do you need the help of another person with your personal care needs such as eating, bathing, dressing, or getting around the house?: No  Can you handle your own money without help?: Yes  During the past four weeks, how would you rate your health in general?: Very good  How have things been going for you during the past four weeks?: Pretty well  Are you having difficulties driving your car?: No  Do you always fasten your seat belt when you are in a car?: Yes, usually  How often in the past four weeks have you been bothered by falling or dizzy when standing up?: Never  How often in the past four weeks have you been bothered by sexual problems?: Never  How often in the past four weeks have you been bothered by trouble eating well?: Never  How often in the past four weeks have you been bothered by teeth or denture problems?: Never  How often in  "the past four weeks have you been bothered with problems using the telephone?: Never  How often in the past four weeks have you been bothered by tiredness or fatigue?: Never  Have you fallen two or more times in the past year?: No  Are you afraid of falling?: No  Are you a smoker?: No  During the past four weeks, how many drinks of wine, beer, or other alcoholic beverages did you have?: One drink or less per week  Do you exercise for about 20 minutes three or more days a week?: Yes, some of the time  Have you been given any information to help you with hazards in your house that might hurt you?: Yes  Have you been given any information to help you with keeping track of your medications?: Yes  How often do you have trouble taking medicines the way you've been told to take them?: I always take them as prescribed  How confident are you that you can control and manage most of your health problems?: Very confident  What is your race? (Check all that apply.):     Objective:     /80 (BP Location: Left arm, Patient Position: Sitting, BP Method: Medium (Manual))   Pulse 83   Temp 97.2 °F (36.2 °C) (Temporal)   Resp 16   Ht 5' 6" (1.676 m)   Wt 71.3 kg (157 lb 1.6 oz)   SpO2 97%   BMI 25.36 kg/m²     Physical Exam  General : Alert and oriented, No acute distress, afebrile.  Eye : PERRLA. EOMI. Normal conjunctiva, Sclerae are nonicteric. No conjunctival injection, no pallor.  HEENT : Normocephalic/ atraumatic, Normal hearing  Respiratory : Respirations are non-labored and clear to auscultation bilaterally. Symmetrical air entry bilaterally, no crackles, no wheezes, no rhonchi. No cyanosis, no clubbing.  Cardiovascular : Normal rate, Regular rhythm. No murmurs, rubs, or gallops. Pulses are 2+ throughout. No JVD. No Edema.  Gastrointestinal : Soft, nontender, non-distended, bowel sounds are present in all quadrants, no organomegaly, no guarding, no rebound.  Musculoskeletal : Normal range of motion " throughout. No muscle tenderness.  Integumentary : Warm, moist, intact.  Neurologic : Alert, Oriented, Cranial Nerves II-XII are grossly intact, No focal neurologic deficits.  Psychiatric : Cooperative, Appropriate mood & affect.     No flowsheet data found.  Fall Risk Assessment - Outpatient 9/14/2022   Mobility Status Ambulatory   Number of falls 0   Identified as fall risk 0           Depression Screening  Over the past two weeks, has the patient felt down, depressed, or hopeless?: No  Over the past two weeks, has the patient felt little interest or pleasure in doing things?: No  Functional Ability/Safety Screening  Was the patient's timed Up & Go test unsteady or longer than 30 seconds?: No  Does the patient need help with phone, transportation, shopping, preparing meals, housework, laundry, meds, or managing money?: No  Does the patient's home have rugs in the hallway, lack grab bars in the bathroom, lack handrails on the stairs or have poor lighting?: No  Have you noticed any hearing difficulties?: No  Cognitive Function (Assessed through direct observation with due consideration of information obtained by way of patient reports and/or concerns raised by family, friends, caretakers, or others)    Does the patient repeat questions/statements in the same day?: No  Does the patient have trouble remembering the date, year, and time?: No  Does the patient have difficulty managing finances?: No  Does the patient have a decreased sense of direction?: No  Assessment/Plan:     1. Annual physical exam    2. Malignant neoplasm of female breast, unspecified estrogen receptor status, unspecified laterality, unspecified site of breast    3. Hypothyroidism, unspecified type    4. Hypertension, unspecified type    5. Postmenopausal  -     DXA Bone Density Spine And Hip         Medicare Annual Wellness and Personalized Prevention Plan:   Fall Risk + Home Safety + Hearing Impairment + Depression Screen + Opioid and Substance  Abuse Screening + Cognitive Impairment Screen + Health Risk Assessment all reviewed.     Health Maintenance Topics with due status: Not Due       Topic Last Completion Date    Colorectal Cancer Screening 11/24/2020    TETANUS VACCINE 03/30/2021    Mammogram 07/07/2022    Lipid Panel 09/12/2022      The patient's Health Maintenance was reviewed and the following appears to be due at this time:   Health Maintenance Due   Topic Date Due    Hepatitis C Screening  Never done    Pneumococcal Vaccines (Age 65+) (1 - PCV) Never done    Shingles Vaccine (2 of 2) 09/11/2020    COVID-19 Vaccine (4 - Booster for Pfizer series) 04/06/2022    DEXA Scan  07/19/2022    Influenza Vaccine (1) 09/01/2022       Advance Care Planning   I attest to discussing Advance Care Planning with patient and/or family member.  Education was provided including the importance of the Health Care Power of , Advance Directives, and/or [place] documentation.  The patient expressed understanding to the importance of this information and discussion.         Follow up in about 6 months (around 3/14/2023) for WELLNESS, with labs prior to visit, NURSE PRACTITIONER. In addition to their scheduled follow up, the patient has also been instructed to follow up on as needed basis.

## 2022-09-14 NOTE — PROGRESS NOTES
Subjective:      Patient ID: Carmen Oh is a 67 y.o. female.    Chief Complaint: No chief complaint on file.      HPI:  67-year-old female presents to clinic for 6 month revisit  Dr. Willams/oncology-history of breast cancer  Dr. Camacho/endocrinology--thyroid lump one half of thyroid then had partial thyroidectomy.  Subsequently became hyperthyroid and put on PTU for 7 years then had thyroidectomy of other lobe.  Parathyroids not salvaged and patient now on calcium supplementation.  Dr. Herman/GYN, up-to-date with Pap and mammogram  Dr. Ornelas/GI, C scope in , GERD     Past Medical History:  Past Medical History:   Diagnosis Date    Hyperthyroidism     Hypocalcemia     R breast cancer      Past Surgical History:   Procedure Laterality Date    BREAST BIOPSY Right     BREAST LUMPECTOMY Right      SECTION      X 2    COLONOSCOPY      2015    THYROIDECTOMY       Review of patient's allergies indicates:  No Known Allergies  Current Outpatient Medications on File Prior to Visit   Medication Sig Dispense Refill    atorvastatin (LIPITOR) 40 MG tablet Take 40 mg by mouth nightly.      calcitRIOL (ROCALTROL) 0.5 MCG Cap Take 1 capsule by mouth once daily 90 capsule 0    hydroCHLOROthiazide (HYDRODIURIL) 25 MG tablet Take 25 mg by mouth once daily.      levothyroxine (SYNTHROID) 100 MCG tablet Take 1 tablet by mouth once daily 90 tablet 0    pantoprazole (PROTONIX) 40 MG tablet Take 1 tablet by mouth once daily 90 tablet 0     No current facility-administered medications on file prior to visit.     Social History     Socioeconomic History    Marital status:    Occupational History    Occupation: Cafeteria tech at [employer]   Tobacco Use    Smoking status: Never    Smokeless tobacco: Never   Substance and Sexual Activity    Alcohol use: Yes     Comment: 1- 2 times/month     Family History   Problem Relation Age of Onset    Prostate cancer Brother     Cervical cancer Maternal Aunt         Review of Systems  A comprehensive review of systems was performed and was negative with exception of what is documented above.     Objective:   There were no vitals taken for this visit.  Physical Exam    Assessment/ Plan:   {There are no diagnoses linked to this encounter. (Refresh or delete this SmartLink)}         No follow-ups on file.

## 2022-12-13 ENCOUNTER — TELEPHONE (OUTPATIENT)
Dept: INTERNAL MEDICINE | Facility: CLINIC | Age: 67
End: 2022-12-13
Payer: MEDICARE

## 2022-12-13 DIAGNOSIS — E87.5 HYPERKALEMIA: Primary | ICD-10-CM

## 2022-12-14 ENCOUNTER — OFFICE VISIT (OUTPATIENT)
Dept: INTERNAL MEDICINE | Facility: CLINIC | Age: 67
End: 2022-12-14
Payer: MEDICARE

## 2022-12-14 ENCOUNTER — LAB VISIT (OUTPATIENT)
Dept: LAB | Facility: HOSPITAL | Age: 67
End: 2022-12-14
Attending: NURSE PRACTITIONER
Payer: MEDICARE

## 2022-12-14 DIAGNOSIS — Z98.890 S/P PARATHYROIDECTOMY: ICD-10-CM

## 2022-12-14 DIAGNOSIS — Z90.89 S/P PARATHYROIDECTOMY: ICD-10-CM

## 2022-12-14 DIAGNOSIS — E83.51 HYPOCALCEMIA: ICD-10-CM

## 2022-12-14 DIAGNOSIS — E87.6 HYPOKALEMIA: ICD-10-CM

## 2022-12-14 PROBLEM — E78.2 MIXED HYPERLIPIDEMIA: Status: ACTIVE | Noted: 2022-12-14

## 2022-12-14 LAB
ALBUMIN SERPL-MCNC: 3.9 G/DL (ref 3.4–4.8)
ALBUMIN/GLOB SERPL: 1.3 RATIO (ref 1.1–2)
ALP SERPL-CCNC: 88 UNIT/L (ref 40–150)
ALT SERPL-CCNC: 19 UNIT/L (ref 0–55)
AST SERPL-CCNC: 26 UNIT/L (ref 5–34)
BILIRUBIN DIRECT+TOT PNL SERPL-MCNC: 0.6 MG/DL
BUN SERPL-MCNC: 10.9 MG/DL (ref 9.8–20.1)
CALCIUM SERPL-MCNC: 7.3 MG/DL (ref 8.4–10.2)
CHLORIDE SERPL-SCNC: 100 MMOL/L (ref 98–107)
CO2 SERPL-SCNC: 31 MMOL/L (ref 23–31)
CREAT SERPL-MCNC: 0.89 MG/DL (ref 0.55–1.02)
DEPRECATED CALCIDIOL+CALCIFEROL SERPL-MC: 91.4 NG/ML (ref 30–80)
GFR SERPLBLD CREATININE-BSD FMLA CKD-EPI: >60 MLS/MIN/1.73/M2
GLOBULIN SER-MCNC: 2.9 GM/DL (ref 2.4–3.5)
GLUCOSE SERPL-MCNC: 99 MG/DL (ref 82–115)
POTASSIUM SERPL-SCNC: 3.6 MMOL/L (ref 3.5–5.1)
PROT SERPL-MCNC: 6.8 GM/DL (ref 5.8–7.6)
SODIUM SERPL-SCNC: 143 MMOL/L (ref 136–145)

## 2022-12-14 PROCEDURE — 36415 COLL VENOUS BLD VENIPUNCTURE: CPT

## 2022-12-14 PROCEDURE — 82306 VITAMIN D 25 HYDROXY: CPT

## 2022-12-14 PROCEDURE — 99214 PR OFFICE/OUTPT VISIT, EST, LEVL IV, 30-39 MIN: ICD-10-PCS | Mod: 95,,, | Performed by: NURSE PRACTITIONER

## 2022-12-14 PROCEDURE — 82330 ASSAY OF CALCIUM: CPT

## 2022-12-14 PROCEDURE — 80053 COMPREHEN METABOLIC PANEL: CPT

## 2022-12-14 PROCEDURE — 99214 OFFICE O/P EST MOD 30 MIN: CPT | Mod: 95,,, | Performed by: NURSE PRACTITIONER

## 2022-12-14 NOTE — ASSESSMENT & PLAN NOTE
Currently taking 1200mg TID of Calcium OTC + Calcitriol 0.5mg daily  Mg + Phos + Albumin normal.  CMP + ionized Ca + Vit D ordered  Further recommendations to follow once labs resulted

## 2022-12-14 NOTE — PROGRESS NOTES
Patient ID: [id number]     Chief Complaint: No chief complaint on file.      HPI:     This is a telemedicine note. Patient was treated using telemedicine, real time audio and video, according to [facility] protocols. [place], conducted the visit from the [facility]. The patient participated in the visit at a non-[facility] location selected by the patient, identified below. I am licensed in the state where the patient stated they are located. The patient stated that they understood and accepted the privacy and security risks to their information at their location. This visit is not recorded.    Patient was located at the patient's home.       Carmen Oh is a 67 y.o. female here today for a telemedicine visit. Complaining of diarrhea x 5 days. Presented to the ED on day 2 of symptoms. Found to be hypocalcemic at 7.0 but also hypokalemic at 3.1 - normal albumin, Mg and Phos. Calcium repleted via IVPB + 1L LR but I don't see where her potassium was repleted or rechecked. Also had a normal abdominal US.  Had thyroidectomy with parathyroidectomy in the 1980s - has been taking Calcitriol 0.5mg daily and 1200mg Calcium TID since. No longer followed by Dr. Doug Guillen.  Denies palpitations, CP, dizziness, tetany, etc - mainly continued GI complaints of nausea + diarrhea.         ----------------------------  Hypertension  Hypocalcemia  Hypokalemia  Hypothyroid  Malignant neoplasm of breast  Mixed hyperlipidemia  S/P parathyroidectomy     Past Surgical History:   Procedure Laterality Date    BREAST BIOPSY Right     BREAST LUMPECTOMY Right      SECTION      X 2    COLONOSCOPY      2015    THYROIDECTOMY         Review of patient's allergies indicates:  No Known Allergies    No outpatient medications have been marked as taking for the 22 encounter (Office Visit) with REZA Dueñas.       Social History     Socioeconomic History    Marital status:    Occupational  History    Occupation: Cafeteria tech at [employer]   Tobacco Use    Smoking status: Never    Smokeless tobacco: Never   Substance and Sexual Activity    Alcohol use: Yes     Comment: 1- 2 times/month    Drug use: Never    Sexual activity: Not Currently        Family History   Problem Relation Age of Onset    Atrial fibrillation Mother     Heart disease Father     Aneurysm Sister     Prostate cancer Brother     Cervical cancer Maternal Aunt         Patient Care Team:  Tacos Falcon MD as PCP - General (Internal Medicine)  CASSIE Ornelas MD as Consulting Physician (Gastroenterology)  Antonio Willams MD as Consulting Physician (Oncology)      Subjective:       ROS    See HPI for details    Constitutional: Denies Change in appetite. Denies Chills. Denies Fever. Denies Night sweats.  Eye: Denies Blurred vision. Denies Discharge. Denies Eye pain.  ENT: Denies Decreased hearing. Denies Sore throat. Denies Swollen glands.  Respiratory: Denies Cough. Denies Shortness of breath. Denies Shortness of breath with exertion. Denies Wheezing.  Cardiovascular: DeniesChest pain at rest. Denies Chest pain with exertion. Denies Irregular heartbeat. Denies Palpitations. Denies Edema.  Gastrointestinal: Denies Abdominal pain. Admits Diarrhea. Denies Nausea. Denies Vomiting. Denies Hematemesis or Hematochezia.  Genitourinary: Denies Dysuria. Denies Urinary frequency. Denies Urinary urgency. Denies Blood in urine.  Endocrine: Denies Cold intolerance. Denies Excessive thirst. Denies Heat intolerance. Denies Weight loss. Denies Weight gain.  Musculoskeletal: Denies Painful joints. Denies Weakness.  Integumentary: Denies Rash. Denies Itching. Denies Dry skin.  Neurologic: Denies Dizziness. Denies Fainting. Denies Headache.  Psychiatric: Denies Depression. Denies Anxiety. Denies Suicidal/Homicidal ideations.    All Other ROS: Negative except as stated in HPI.       Objective:     There were no vitals taken for this  visit.    Physical Exam    Physical Exam: LIMITED DUE TO TELEMEDICINE RESTRICTIONS.  General: Alert and oriented, No acute distress.  Head: Normocephalic, Atraumatic.  Eye: Sclera non-icteric.  Neck/Thyroid:  Full range of motion.  Respiratory: Non-labored respirations, Symmetrical chest wall expansion.  Musculoskeletal: Normal range of motion.  Integumentary:  No visible suspicious lesions or rashes. No diaphoresis.   Neurologic: No focal deficits  Psychiatric: Normal interaction, Coherent speech, Euthymic mood, Appropriate affect       Assessment:       ICD-10-CM ICD-9-CM   1. Hypocalcemia  E83.51 275.41   2. S/P parathyroidectomy  E89.2 252.8   3. Hypokalemia  E87.6 276.8        Plan:     1. Hypocalcemia  Assessment & Plan:  Currently taking 1200mg TID of Calcium OTC + Calcitriol 0.5mg daily  Mg + Phos + Albumin normal.  CMP + ionized Ca + Vit D ordered  Further recommendations to follow once labs resulted    Orders:  -     Calcium, Ionized; Future; Expected date: 12/14/2022  -     Comprehensive Metabolic Panel; Future; Expected date: 12/14/2022  -     Vitamin D; Future; Expected date: 12/14/2022    2. S/P parathyroidectomy  Overview:  1981-thyroid lump one half of thyroid then had partial thyroidectomy.  Subsequently became hyperthyroid and put on PTU for 7 years then had thyroidectomy of other lobe.  Parathyroids not salvaged and patient now on calcium supplementation.      3. Hypokalemia  Assessment & Plan:  Repeat CMP ordered  Takes HCTZ 25mg daily - typically runs upper 3's              Medication List with Changes/Refills   Current Medications    ATORVASTATIN (LIPITOR) 40 MG TABLET    Take 40 mg by mouth nightly.       Start Date: 5/23/2022 End Date: --    CALCITRIOL (ROCALTROL) 0.5 MCG CAP    Take 1 capsule by mouth once daily       Start Date: 11/21/2022End Date: --    HYDROCHLOROTHIAZIDE (HYDRODIURIL) 25 MG TABLET    Take 25 mg by mouth once daily.       Start Date: 4/1/2022  End Date: --     LEVOTHYROXINE (SYNTHROID) 100 MCG TABLET    Take 1 tablet (100 mcg total) by mouth before breakfast.       Start Date: 12/6/2022 End Date: --    PANTOPRAZOLE (PROTONIX) 40 MG TABLET    Take 1 tablet by mouth once daily       Start Date: 11/21/2022End Date: --        In addition to their scheduled follow up, the patient has also been instructed to follow up on as needed basis.       Video Time Documentation:  Spent 25 minutes with patient face to face discussed health concerns. More than 50% of this time was spent in counseling and coordination of care.

## 2022-12-15 ENCOUNTER — TELEPHONE (OUTPATIENT)
Dept: INTERNAL MEDICINE | Facility: CLINIC | Age: 67
End: 2022-12-15
Payer: MEDICARE

## 2022-12-15 LAB — CA-I ADJ PH7.4 SERPL ISE-MCNC: 3.41 MG/DL (ref 4.57–5.43)

## 2022-12-15 NOTE — PROGRESS NOTES
Calcium levels reviewed patient currently on calcitriol 0.5 mcg daily and 1200 mg of calcium t.i.d.  Currently calcium level sits at 7.3  Increase calcitriol to twice a day  Recheck calcium level on Monday  Any symptoms?

## 2022-12-15 NOTE — TELEPHONE ENCOUNTER
----- Message from Tacos Falcon MD sent at 12/15/2022  8:34 AM CST -----  Calcium levels reviewed patient currently on calcitriol 0.5 mcg daily and 1200 mg of calcium t.i.d.  Currently calcium level sits at 7.3  Increase calcitriol to twice a day  Recheck calcium level on Monday  Any symptoms?

## 2022-12-16 ENCOUNTER — TELEPHONE (OUTPATIENT)
Dept: INTERNAL MEDICINE | Facility: CLINIC | Age: 67
End: 2022-12-16
Payer: MEDICARE

## 2022-12-19 PROBLEM — Z00.00 ANNUAL PHYSICAL EXAM: Status: RESOLVED | Noted: 2022-09-14 | Resolved: 2022-12-19

## 2022-12-20 ENCOUNTER — TELEPHONE (OUTPATIENT)
Dept: INTERNAL MEDICINE | Facility: CLINIC | Age: 67
End: 2022-12-20
Payer: MEDICARE

## 2022-12-20 ENCOUNTER — LAB VISIT (OUTPATIENT)
Dept: LAB | Facility: HOSPITAL | Age: 67
End: 2022-12-20
Attending: INTERNAL MEDICINE
Payer: MEDICARE

## 2022-12-20 DIAGNOSIS — E87.5 HYPERKALEMIA: ICD-10-CM

## 2022-12-20 LAB
ALBUMIN SERPL-MCNC: 3.9 G/DL (ref 3.4–4.8)
ALBUMIN/GLOB SERPL: 1.4 RATIO (ref 1.1–2)
ALP SERPL-CCNC: 82 UNIT/L (ref 40–150)
ALT SERPL-CCNC: 24 UNIT/L (ref 0–55)
AST SERPL-CCNC: 23 UNIT/L (ref 5–34)
BILIRUBIN DIRECT+TOT PNL SERPL-MCNC: 1.1 MG/DL
BUN SERPL-MCNC: 11.7 MG/DL (ref 9.8–20.1)
CALCIUM SERPL-MCNC: 8.9 MG/DL (ref 8.4–10.2)
CHLORIDE SERPL-SCNC: 103 MMOL/L (ref 98–107)
CO2 SERPL-SCNC: 32 MMOL/L (ref 23–31)
CREAT SERPL-MCNC: 0.88 MG/DL (ref 0.55–1.02)
GFR SERPLBLD CREATININE-BSD FMLA CKD-EPI: >60 MLS/MIN/1.73/M2
GLOBULIN SER-MCNC: 2.7 GM/DL (ref 2.4–3.5)
GLUCOSE SERPL-MCNC: 109 MG/DL (ref 82–115)
POTASSIUM SERPL-SCNC: 4.4 MMOL/L (ref 3.5–5.1)
PROT SERPL-MCNC: 6.6 GM/DL (ref 5.8–7.6)
SODIUM SERPL-SCNC: 142 MMOL/L (ref 136–145)

## 2022-12-20 PROCEDURE — 36415 COLL VENOUS BLD VENIPUNCTURE: CPT

## 2022-12-20 PROCEDURE — 80053 COMPREHEN METABOLIC PANEL: CPT

## 2022-12-20 NOTE — TELEPHONE ENCOUNTER
----- Message from Tacos Falcon MD sent at 12/20/2022  8:20 AM CST -----  Calcium level reviewed and stable at 8.9, continue calcitriol 0.5 mcg twice a day please ensure patient is not doing more than 6 tums per day as it starts to interfere with absorption of medication and can cause constipation.

## 2022-12-20 NOTE — PROGRESS NOTES
Calcium level reviewed and stable at 8.9, continue calcitriol 0.5 mcg twice a day please ensure patient is not doing more than 6 tums per day as it starts to interfere with absorption of medication and can cause constipation.

## 2022-12-21 ENCOUNTER — PATIENT MESSAGE (OUTPATIENT)
Dept: INTERNAL MEDICINE | Facility: CLINIC | Age: 67
End: 2022-12-21
Payer: MEDICARE

## 2022-12-22 ENCOUNTER — TELEPHONE (OUTPATIENT)
Dept: INTERNAL MEDICINE | Facility: CLINIC | Age: 67
End: 2022-12-22
Payer: MEDICARE

## 2022-12-22 DIAGNOSIS — I10 HYPERTENSION, UNSPECIFIED TYPE: Primary | ICD-10-CM

## 2022-12-22 RX ORDER — HYDROCHLOROTHIAZIDE 25 MG/1
25 TABLET ORAL DAILY
Qty: 90 TABLET | Refills: 0 | Status: SHIPPED | OUTPATIENT
Start: 2022-12-22 | End: 2023-04-02 | Stop reason: SDUPTHER

## 2022-12-22 NOTE — TELEPHONE ENCOUNTER
----- Message from Cele Lomas sent at 12/22/2022 11:18 AM CST -----  Hydrochlorothiazide 25MG, Qty: 90

## 2022-12-29 ENCOUNTER — DOCUMENTATION ONLY (OUTPATIENT)
Dept: ADMINISTRATIVE | Facility: HOSPITAL | Age: 67
End: 2022-12-29
Payer: MEDICARE

## 2023-01-04 ENCOUNTER — DOCUMENTATION ONLY (OUTPATIENT)
Dept: ADMINISTRATIVE | Facility: HOSPITAL | Age: 68
End: 2023-01-04
Payer: MEDICARE

## 2023-02-27 DIAGNOSIS — E55.9 VITAMIN D DEFICIENCY: ICD-10-CM

## 2023-02-27 RX ORDER — CALCITRIOL 0.5 UG/1
0.5 CAPSULE ORAL 2 TIMES DAILY
Qty: 90 CAPSULE | Refills: 3 | Status: SHIPPED | OUTPATIENT
Start: 2023-02-27 | End: 2023-08-07

## 2023-03-14 ENCOUNTER — PATIENT MESSAGE (OUTPATIENT)
Dept: INTERNAL MEDICINE | Facility: CLINIC | Age: 68
End: 2023-03-14
Payer: MEDICARE

## 2023-03-14 DIAGNOSIS — E87.6 HYPOKALEMIA: ICD-10-CM

## 2023-03-14 DIAGNOSIS — E78.5 HYPERLIPIDEMIA, UNSPECIFIED HYPERLIPIDEMIA TYPE: ICD-10-CM

## 2023-03-14 DIAGNOSIS — E83.51 HYPOCALCEMIA: ICD-10-CM

## 2023-03-14 DIAGNOSIS — E03.9 HYPOTHYROIDISM, UNSPECIFIED TYPE: ICD-10-CM

## 2023-03-14 DIAGNOSIS — I10 HYPERTENSION, UNSPECIFIED TYPE: ICD-10-CM

## 2023-03-20 ENCOUNTER — LAB VISIT (OUTPATIENT)
Dept: LAB | Facility: HOSPITAL | Age: 68
End: 2023-03-20
Attending: INTERNAL MEDICINE
Payer: MEDICARE

## 2023-03-20 DIAGNOSIS — E87.6 HYPOKALEMIA: ICD-10-CM

## 2023-03-20 DIAGNOSIS — E78.5 HYPERLIPIDEMIA, UNSPECIFIED HYPERLIPIDEMIA TYPE: ICD-10-CM

## 2023-03-20 DIAGNOSIS — E03.9 HYPOTHYROIDISM, UNSPECIFIED TYPE: ICD-10-CM

## 2023-03-20 DIAGNOSIS — I10 HYPERTENSION, UNSPECIFIED TYPE: ICD-10-CM

## 2023-03-20 DIAGNOSIS — E83.51 HYPOCALCEMIA: ICD-10-CM

## 2023-03-20 LAB
ALBUMIN SERPL-MCNC: 4 G/DL (ref 3.4–4.8)
ALBUMIN/GLOB SERPL: 1.5 RATIO (ref 1.1–2)
ALP SERPL-CCNC: 81 UNIT/L (ref 40–150)
ALT SERPL-CCNC: 16 UNIT/L (ref 0–55)
APPEARANCE UR: CLEAR
AST SERPL-CCNC: 18 UNIT/L (ref 5–34)
BACTERIA #/AREA URNS AUTO: NORMAL /HPF
BASOPHILS # BLD AUTO: 0.06 X10(3)/MCL (ref 0–0.2)
BASOPHILS NFR BLD AUTO: 0.9 %
BILIRUB UR QL STRIP.AUTO: NEGATIVE MG/DL
BILIRUBIN DIRECT+TOT PNL SERPL-MCNC: 0.7 MG/DL
BUN SERPL-MCNC: 15.5 MG/DL (ref 9.8–20.1)
CALCIUM SERPL-MCNC: 9 MG/DL (ref 8.4–10.2)
CHLORIDE SERPL-SCNC: 102 MMOL/L (ref 98–107)
CHOLEST SERPL-MCNC: 181 MG/DL
CHOLEST/HDLC SERPL: 3 {RATIO} (ref 0–5)
CO2 SERPL-SCNC: 29 MMOL/L (ref 23–31)
COLOR UR AUTO: YELLOW
CREAT SERPL-MCNC: 0.88 MG/DL (ref 0.55–1.02)
EOSINOPHIL # BLD AUTO: 0.11 X10(3)/MCL (ref 0–0.9)
EOSINOPHIL NFR BLD AUTO: 1.7 %
ERYTHROCYTE [DISTWIDTH] IN BLOOD BY AUTOMATED COUNT: 13.5 % (ref 11.5–17)
GFR SERPLBLD CREATININE-BSD FMLA CKD-EPI: >60 MLS/MIN/1.73/M2
GLOBULIN SER-MCNC: 2.6 GM/DL (ref 2.4–3.5)
GLUCOSE SERPL-MCNC: 103 MG/DL (ref 82–115)
GLUCOSE UR QL STRIP.AUTO: NEGATIVE MG/DL
HCT VFR BLD AUTO: 45.7 % (ref 37–47)
HDLC SERPL-MCNC: 60 MG/DL (ref 35–60)
HGB BLD-MCNC: 14.4 G/DL (ref 12–16)
IMM GRANULOCYTES # BLD AUTO: 0.02 X10(3)/MCL (ref 0–0.04)
IMM GRANULOCYTES NFR BLD AUTO: 0.3 %
KETONES UR QL STRIP.AUTO: NEGATIVE MG/DL
LDLC SERPL CALC-MCNC: 94 MG/DL (ref 50–140)
LEUKOCYTE ESTERASE UR QL STRIP.AUTO: ABNORMAL UNIT/L
LYMPHOCYTES # BLD AUTO: 2.26 X10(3)/MCL (ref 0.6–4.6)
LYMPHOCYTES NFR BLD AUTO: 34.7 %
MCH RBC QN AUTO: 28 PG
MCHC RBC AUTO-ENTMCNC: 31.5 G/DL (ref 33–36)
MCV RBC AUTO: 88.9 FL (ref 80–94)
MONOCYTES # BLD AUTO: 0.46 X10(3)/MCL (ref 0.1–1.3)
MONOCYTES NFR BLD AUTO: 7.1 %
NEUTROPHILS # BLD AUTO: 3.61 X10(3)/MCL (ref 2.1–9.2)
NEUTROPHILS NFR BLD AUTO: 55.3 %
NITRITE UR QL STRIP.AUTO: NEGATIVE
NRBC BLD AUTO-RTO: 0 %
PH UR STRIP.AUTO: 7 [PH]
PLATELET # BLD AUTO: 273 X10(3)/MCL (ref 130–400)
PMV BLD AUTO: 10.6 FL (ref 7.4–10.4)
POTASSIUM SERPL-SCNC: 4 MMOL/L (ref 3.5–5.1)
PROT SERPL-MCNC: 6.6 GM/DL (ref 5.8–7.6)
PROT UR QL STRIP.AUTO: NEGATIVE MG/DL
RBC # BLD AUTO: 5.14 X10(6)/MCL (ref 4.2–5.4)
RBC #/AREA URNS AUTO: NORMAL /HPF
RBC UR QL AUTO: NEGATIVE UNIT/L
SODIUM SERPL-SCNC: 142 MMOL/L (ref 136–145)
SP GR UR STRIP.AUTO: 1.02 (ref 1–1.03)
SQUAMOUS #/AREA URNS AUTO: <5 /HPF
T4 FREE SERPL-MCNC: 1.38 NG/DL (ref 0.7–1.48)
TRIGL SERPL-MCNC: 136 MG/DL (ref 37–140)
TSH SERPL-ACNC: 1.62 UIU/ML (ref 0.35–4.94)
UROBILINOGEN UR STRIP-ACNC: 0.2 MG/DL
VLDLC SERPL CALC-MCNC: 27 MG/DL
WBC # SPEC AUTO: 6.5 X10(3)/MCL (ref 4.5–11.5)
WBC #/AREA URNS AUTO: <5 /HPF

## 2023-03-20 PROCEDURE — 84439 ASSAY OF FREE THYROXINE: CPT

## 2023-03-20 PROCEDURE — 81001 URINALYSIS AUTO W/SCOPE: CPT

## 2023-03-20 PROCEDURE — 80061 LIPID PANEL: CPT

## 2023-03-20 PROCEDURE — 36415 COLL VENOUS BLD VENIPUNCTURE: CPT

## 2023-03-20 PROCEDURE — 85025 COMPLETE CBC W/AUTO DIFF WBC: CPT

## 2023-03-20 PROCEDURE — 80053 COMPREHEN METABOLIC PANEL: CPT

## 2023-03-20 PROCEDURE — 84443 ASSAY THYROID STIM HORMONE: CPT

## 2023-03-21 ENCOUNTER — OFFICE VISIT (OUTPATIENT)
Dept: INTERNAL MEDICINE | Facility: CLINIC | Age: 68
End: 2023-03-21
Payer: MEDICARE

## 2023-03-21 DIAGNOSIS — Z98.890 S/P PARATHYROIDECTOMY: ICD-10-CM

## 2023-03-21 DIAGNOSIS — Z90.89 S/P PARATHYROIDECTOMY: ICD-10-CM

## 2023-03-21 DIAGNOSIS — E78.2 MIXED HYPERLIPIDEMIA: ICD-10-CM

## 2023-03-21 DIAGNOSIS — I10 HYPERTENSION, UNSPECIFIED TYPE: ICD-10-CM

## 2023-03-21 DIAGNOSIS — E83.51 HYPOCALCEMIA: Primary | ICD-10-CM

## 2023-03-21 PROCEDURE — 99214 PR OFFICE/OUTPT VISIT, EST, LEVL IV, 30-39 MIN: ICD-10-PCS | Mod: 95,,, | Performed by: INTERNAL MEDICINE

## 2023-03-21 PROCEDURE — 99214 OFFICE O/P EST MOD 30 MIN: CPT | Mod: 95,,, | Performed by: INTERNAL MEDICINE

## 2023-06-01 ENCOUNTER — TELEPHONE (OUTPATIENT)
Dept: INTERNAL MEDICINE | Facility: CLINIC | Age: 68
End: 2023-06-01
Payer: MEDICARE

## 2023-06-01 DIAGNOSIS — I10 HYPERTENSION, UNSPECIFIED TYPE: Primary | ICD-10-CM

## 2023-06-01 RX ORDER — ATORVASTATIN CALCIUM 40 MG/1
40 TABLET, FILM COATED ORAL NIGHTLY
Qty: 90 TABLET | Refills: 1 | Status: SHIPPED | OUTPATIENT
Start: 2023-06-01 | End: 2023-11-27

## 2023-06-05 DIAGNOSIS — K21.9 GASTROESOPHAGEAL REFLUX DISEASE, UNSPECIFIED WHETHER ESOPHAGITIS PRESENT: ICD-10-CM

## 2023-06-05 RX ORDER — PANTOPRAZOLE SODIUM 40 MG/1
40 TABLET, DELAYED RELEASE ORAL DAILY
Qty: 90 TABLET | Refills: 0 | Status: SHIPPED | OUTPATIENT
Start: 2023-06-05 | End: 2023-08-28

## 2023-06-06 DIAGNOSIS — E03.9 HYPOTHYROIDISM, UNSPECIFIED TYPE: ICD-10-CM

## 2023-06-07 RX ORDER — LEVOTHYROXINE SODIUM 100 UG/1
100 TABLET ORAL
Qty: 90 TABLET | Refills: 1 | Status: SHIPPED | OUTPATIENT
Start: 2023-06-07 | End: 2023-11-27

## 2023-06-14 ENCOUNTER — TELEPHONE (OUTPATIENT)
Dept: INTERNAL MEDICINE | Facility: CLINIC | Age: 68
End: 2023-06-14
Payer: MEDICARE

## 2023-06-14 NOTE — TELEPHONE ENCOUNTER
----- Message from Corby Gross MA sent at 6/14/2023 10:12 AM CDT -----  Regarding: PV 6/21/23 @ 1:40 REZA Angel  1. Are there any outstanding tasks in the patient's chart? Yes, fasting labs    2. Is there any documentation in the chart? No    3.Has patient been seen in an ER, Urgent care clinic, or been admitted since last visit?  If yes, When, where, and why    4. Has patient seen any other healthcare providers since last visit?  If yes, when, where, and why    5. Has patient had any bloodwork or XR done since last visit?    6. Is patient signed up for patient portal?

## 2023-06-23 DIAGNOSIS — I10 HYPERTENSION, UNSPECIFIED TYPE: ICD-10-CM

## 2023-06-23 RX ORDER — HYDROCHLOROTHIAZIDE 25 MG/1
TABLET ORAL
Qty: 90 TABLET | Refills: 0 | Status: SHIPPED | OUTPATIENT
Start: 2023-06-23 | End: 2023-09-21

## 2023-07-11 ENCOUNTER — HOSPITAL ENCOUNTER (OUTPATIENT)
Dept: RADIOLOGY | Facility: HOSPITAL | Age: 68
Discharge: HOME | End: 2023-07-11
Attending: NURSE PRACTITIONER
Payer: MEDICARE

## 2023-07-11 DIAGNOSIS — Z12.31 ENCOUNTER FOR SCREENING MAMMOGRAM FOR MALIGNANT NEOPLASM OF BREAST: ICD-10-CM

## 2023-07-11 DIAGNOSIS — Z12.39 ENCOUNTER FOR SCREENING FOR MALIGNANT NEOPLASM OF BREAST, UNSPECIFIED SCREENING MODALITY: ICD-10-CM

## 2023-07-11 PROCEDURE — 77063 BREAST TOMOSYNTHESIS BI: CPT | Mod: 26,,, | Performed by: RADIOLOGY

## 2023-07-11 PROCEDURE — 77067 MAMMO DIGITAL SCREENING BILAT WITH TOMO: ICD-10-PCS | Mod: 26,,, | Performed by: RADIOLOGY

## 2023-07-11 PROCEDURE — 77067 SCR MAMMO BI INCL CAD: CPT | Mod: TC

## 2023-07-11 PROCEDURE — 77067 SCR MAMMO BI INCL CAD: CPT | Mod: 26,,, | Performed by: RADIOLOGY

## 2023-07-11 PROCEDURE — 77063 MAMMO DIGITAL SCREENING BILAT WITH TOMO: ICD-10-PCS | Mod: 26,,, | Performed by: RADIOLOGY

## 2023-08-07 DIAGNOSIS — E55.9 VITAMIN D DEFICIENCY: ICD-10-CM

## 2023-08-07 RX ORDER — CALCITRIOL 0.5 UG/1
0.5 CAPSULE ORAL 2 TIMES DAILY
Qty: 90 CAPSULE | Refills: 0 | Status: SHIPPED | OUTPATIENT
Start: 2023-08-07 | End: 2023-09-25

## 2023-08-18 ENCOUNTER — LAB VISIT (OUTPATIENT)
Dept: LAB | Facility: HOSPITAL | Age: 68
End: 2023-08-18
Attending: INTERNAL MEDICINE
Payer: MEDICARE

## 2023-08-18 DIAGNOSIS — Z85.3 PERSONAL HISTORY OF BREAST CANCER: ICD-10-CM

## 2023-08-18 LAB
ALBUMIN SERPL-MCNC: 4 G/DL (ref 3.4–4.8)
ALBUMIN/GLOB SERPL: 1.4 RATIO (ref 1.1–2)
ALP SERPL-CCNC: 97 UNIT/L (ref 40–150)
ALT SERPL-CCNC: 18 UNIT/L (ref 0–55)
AST SERPL-CCNC: 22 UNIT/L (ref 5–34)
BASOPHILS # BLD AUTO: 0.05 X10(3)/MCL
BASOPHILS NFR BLD AUTO: 0.7 %
BILIRUB SERPL-MCNC: 0.9 MG/DL
BUN SERPL-MCNC: 17.5 MG/DL (ref 9.8–20.1)
CALCIUM SERPL-MCNC: 9.7 MG/DL (ref 8.4–10.2)
CEA SERPL-MCNC: <1.73 NG/ML (ref 0–3)
CHLORIDE SERPL-SCNC: 100 MMOL/L (ref 98–107)
CO2 SERPL-SCNC: 31 MMOL/L (ref 23–31)
CREAT SERPL-MCNC: 0.91 MG/DL (ref 0.55–1.02)
EOSINOPHIL # BLD AUTO: 0.07 X10(3)/MCL (ref 0–0.9)
EOSINOPHIL NFR BLD AUTO: 1 %
ERYTHROCYTE [DISTWIDTH] IN BLOOD BY AUTOMATED COUNT: 13.3 % (ref 11.5–17)
GFR SERPLBLD CREATININE-BSD FMLA CKD-EPI: >60 MLS/MIN/1.73/M2
GLOBULIN SER-MCNC: 2.9 GM/DL (ref 2.4–3.5)
GLUCOSE SERPL-MCNC: 105 MG/DL (ref 82–115)
HCT VFR BLD AUTO: 45.5 % (ref 37–47)
HGB BLD-MCNC: 14.4 G/DL (ref 12–16)
IMM GRANULOCYTES # BLD AUTO: 0 X10(3)/MCL (ref 0–0.04)
IMM GRANULOCYTES NFR BLD AUTO: 0 %
LYMPHOCYTES # BLD AUTO: 2.32 X10(3)/MCL (ref 0.6–4.6)
LYMPHOCYTES NFR BLD AUTO: 33.1 %
MCH RBC QN AUTO: 28 PG (ref 27–31)
MCHC RBC AUTO-ENTMCNC: 31.6 G/DL (ref 33–36)
MCV RBC AUTO: 88.3 FL (ref 80–94)
MONOCYTES # BLD AUTO: 0.57 X10(3)/MCL (ref 0.1–1.3)
MONOCYTES NFR BLD AUTO: 8.1 %
NEUTROPHILS # BLD AUTO: 3.99 X10(3)/MCL (ref 2.1–9.2)
NEUTROPHILS NFR BLD AUTO: 57.1 %
PLATELET # BLD AUTO: 288 X10(3)/MCL (ref 130–400)
PMV BLD AUTO: 10.4 FL (ref 7.4–10.4)
POTASSIUM SERPL-SCNC: 4.4 MMOL/L (ref 3.5–5.1)
PROT SERPL-MCNC: 6.9 GM/DL (ref 5.8–7.6)
RBC # BLD AUTO: 5.15 X10(6)/MCL (ref 4.2–5.4)
SODIUM SERPL-SCNC: 142 MMOL/L (ref 136–145)
WBC # SPEC AUTO: 7 X10(3)/MCL (ref 4.5–11.5)

## 2023-08-18 PROCEDURE — 85025 COMPLETE CBC W/AUTO DIFF WBC: CPT

## 2023-08-18 PROCEDURE — 86300 IMMUNOASSAY TUMOR CA 15-3: CPT

## 2023-08-18 PROCEDURE — 82378 CARCINOEMBRYONIC ANTIGEN: CPT

## 2023-08-18 PROCEDURE — 36415 COLL VENOUS BLD VENIPUNCTURE: CPT

## 2023-08-18 PROCEDURE — 80053 COMPREHEN METABOLIC PANEL: CPT

## 2023-08-21 LAB — CANCER AG27-29 SERPL-ACNC: <12 U/ML

## 2023-08-23 ENCOUNTER — OFFICE VISIT (OUTPATIENT)
Dept: HEMATOLOGY/ONCOLOGY | Facility: CLINIC | Age: 68
End: 2023-08-23
Payer: MEDICARE

## 2023-08-23 VITALS
TEMPERATURE: 99 F | HEART RATE: 78 BPM | WEIGHT: 159 LBS | RESPIRATION RATE: 15 BRPM | OXYGEN SATURATION: 98 % | HEIGHT: 66 IN | BODY MASS INDEX: 25.55 KG/M2 | DIASTOLIC BLOOD PRESSURE: 82 MMHG | SYSTOLIC BLOOD PRESSURE: 122 MMHG

## 2023-08-23 DIAGNOSIS — C50.211 MALIGNANT NEOPLASM OF UPPER-INNER QUADRANT OF RIGHT FEMALE BREAST, UNSPECIFIED ESTROGEN RECEPTOR STATUS: Primary | ICD-10-CM

## 2023-08-23 PROCEDURE — 3008F PR BODY MASS INDEX (BMI) DOCUMENTED: ICD-10-PCS | Mod: CPTII,S$GLB,, | Performed by: NURSE PRACTITIONER

## 2023-08-23 PROCEDURE — 3288F FALL RISK ASSESSMENT DOCD: CPT | Mod: CPTII,S$GLB,, | Performed by: NURSE PRACTITIONER

## 2023-08-23 PROCEDURE — 1101F PR PT FALLS ASSESS DOC 0-1 FALLS W/OUT INJ PAST YR: ICD-10-PCS | Mod: CPTII,S$GLB,, | Performed by: NURSE PRACTITIONER

## 2023-08-23 PROCEDURE — 3079F DIAST BP 80-89 MM HG: CPT | Mod: CPTII,S$GLB,, | Performed by: NURSE PRACTITIONER

## 2023-08-23 PROCEDURE — 3008F BODY MASS INDEX DOCD: CPT | Mod: CPTII,S$GLB,, | Performed by: NURSE PRACTITIONER

## 2023-08-23 PROCEDURE — 1126F AMNT PAIN NOTED NONE PRSNT: CPT | Mod: CPTII,S$GLB,, | Performed by: NURSE PRACTITIONER

## 2023-08-23 PROCEDURE — 3288F PR FALLS RISK ASSESSMENT DOCUMENTED: ICD-10-PCS | Mod: CPTII,S$GLB,, | Performed by: NURSE PRACTITIONER

## 2023-08-23 PROCEDURE — 3079F PR MOST RECENT DIASTOLIC BLOOD PRESSURE 80-89 MM HG: ICD-10-PCS | Mod: CPTII,S$GLB,, | Performed by: NURSE PRACTITIONER

## 2023-08-23 PROCEDURE — 99213 PR OFFICE/OUTPT VISIT, EST, LEVL III, 20-29 MIN: ICD-10-PCS | Mod: S$GLB,,, | Performed by: NURSE PRACTITIONER

## 2023-08-23 PROCEDURE — 1101F PT FALLS ASSESS-DOCD LE1/YR: CPT | Mod: CPTII,S$GLB,, | Performed by: NURSE PRACTITIONER

## 2023-08-23 PROCEDURE — 1159F MED LIST DOCD IN RCRD: CPT | Mod: CPTII,S$GLB,, | Performed by: NURSE PRACTITIONER

## 2023-08-23 PROCEDURE — 99999 PR PBB SHADOW E&M-EST. PATIENT-LVL IV: CPT | Mod: PBBFAC,,, | Performed by: NURSE PRACTITIONER

## 2023-08-23 PROCEDURE — 1126F PR PAIN SEVERITY QUANTIFIED, NO PAIN PRESENT: ICD-10-PCS | Mod: CPTII,S$GLB,, | Performed by: NURSE PRACTITIONER

## 2023-08-23 PROCEDURE — 1159F PR MEDICATION LIST DOCUMENTED IN MEDICAL RECORD: ICD-10-PCS | Mod: CPTII,S$GLB,, | Performed by: NURSE PRACTITIONER

## 2023-08-23 PROCEDURE — 1160F PR REVIEW ALL MEDS BY PRESCRIBER/CLIN PHARMACIST DOCUMENTED: ICD-10-PCS | Mod: CPTII,S$GLB,, | Performed by: NURSE PRACTITIONER

## 2023-08-23 PROCEDURE — 3074F PR MOST RECENT SYSTOLIC BLOOD PRESSURE < 130 MM HG: ICD-10-PCS | Mod: CPTII,S$GLB,, | Performed by: NURSE PRACTITIONER

## 2023-08-23 PROCEDURE — 99213 OFFICE O/P EST LOW 20 MIN: CPT | Mod: S$GLB,,, | Performed by: NURSE PRACTITIONER

## 2023-08-23 PROCEDURE — 1160F RVW MEDS BY RX/DR IN RCRD: CPT | Mod: CPTII,S$GLB,, | Performed by: NURSE PRACTITIONER

## 2023-08-23 PROCEDURE — 99999 PR PBB SHADOW E&M-EST. PATIENT-LVL IV: ICD-10-PCS | Mod: PBBFAC,,, | Performed by: NURSE PRACTITIONER

## 2023-08-23 PROCEDURE — 3074F SYST BP LT 130 MM HG: CPT | Mod: CPTII,S$GLB,, | Performed by: NURSE PRACTITIONER

## 2023-08-23 NOTE — PROGRESS NOTES
Subjective:       Patient ID: Carmen Oh is a 68 y.o. female.    Chief Complaint: No new problems    Diagnosis: Stage IA right breast cancer 4/15 (T1a N0 M0), 0.4 cm, grade III, ER/ND +, HER-2 +      Postmenopausal with intact uterus                  Heterozygous MUTYH mutation                  + COVID-19 vaccination    Current Treatment: Observation  Treatment History: MammoSite XRT completed 5/1/15; adjuvant Herceptin x 1 year completed 5/16; Femara x 5 yrs (completed 7/20)    Clinical History: Patient presented with an abnormal annual mammogram 3/23/15 showing a suspicious lesion at the 3 o'clock position of the right breast.  Stereotactic biopsy 3/31/15 was positive for invasive ductal carcinoma.  She underwent a lumpectomy and sentinel lymph node dissection on the right 4/15/15.  Final pathology showed a 0.4 cm grade 3 infiltrating ductal carcinoma with focal mucinous features.  There was also DCIS present.  Resection margins were clear.  6 sentinel lymph nodes were negative for involvement.  ER +94%, ND +82% and HER-2 positive for overexpression by IHC and FISH.  Following her surgery, she underwent MammoSite radiation therapy to the right breast completing treatment 5/1/15.  She tolerated therapy well without any significant complications.      She was evaluated 5/4/15 for a Medical Oncology opinion.  Treatment options included: 1)TCH x 4 cycles with Herceptin continuing for one year, 2) single agent Herceptin for one year, or 3) no systemic adjuvant treatment.  Patient opted for adjuvant treatment with single agent Herceptin for 1 year followed by adjuvant hormonal therapy with an aromatase inhibitor.  She received her first cycle of Herceptin 5/18/15.  Baseline echocardiogram revealed normal left ventricular function with an ejection fraction of 55%.  There was no diastolic dysfunction.  Echocardiogram 12/28/15 revealed normal LV function with an EF of 55-60%.  There was impaired relaxation  compatible with diastolic dysfunction, trace mitral regurgitation and mild (1+) tricuspid regurgitation.  Repeat echocardiogram 3/24/16 showed a normal ejection fraction of 50% with mild mitral regurgitation and 2-3+ tricuspid regurgitation.  She was evaluated by cardiology who reviewed her echocardiogram and found her to be asymptomatic.  No additional treatment was indicated and follow-up echocardiogram was recommended in one year.  She completed 1 year of Herceptin therapy 5/6/16.    She underwent genetic testing by Dr. Trevino which revealed a heterozygous MUTYH mutation which is associated with an increased risk of colon cancer.  She had a colonoscopy in 2015 which revealed a single small polyp which was removed.  Follow-up exam was recommended in 5 years.  She had symptoms of vaginal atrophy and was started on low-dose vaginal estrogen therapy by her gynecologist with improvement in her symptoms. She had an EGD 10/4/18 which showed evidence of reflux esophagitis and mild erythema in the stomach. She was changed from Protonix to Ranitidine with improvement in her symptoms. She completed 5 years of adjuvant hormonal therapy with Femara 7/20.    She had a surveillance colonoscopy 11/24/20 showing a single 3-4 mm polyp in the ascending colon which was a tubular adenoma. Follow-up exam was recommended in 7 years. Bone density exam 1/19/22 showed normal bone density of both hips and femoral necks.       Interval History   Mrs. Oh is here today by herself for her annual breast cancer surveillance visit.  She feels well and has no complaints.  She is now over 8 years out from her diagnosis and surgical resection.  She denies any changes to her self breast exam.  Bilateral screening mammogram 7/13/23 showed stable postlumpectomy and postradiation change in the right breast.  There were no new or suspicious findings.  She denies any recent procedures.  She was treated in the ER for symptomatic hypocalcemia 12/22.   Her Calcitriol dose was adjusted by her PCP.  Laboratory for this visit was normal, including tumor markers, and reviewed today with the patient.  She is scheduled for a follow-up bone density exam .    Review of patient's allergies indicates:  No Known Allergies   Review of Systems   Constitutional: Negative.  Negative for appetite change and unexpected weight change.   HENT: Negative.  Negative for mouth sores.    Eyes: Negative.  Negative for visual disturbance.   Respiratory:  Negative for cough, chest tightness and shortness of breath.    Cardiovascular:  Negative for chest pain and leg swelling.   Gastrointestinal:  Negative for abdominal pain, blood in stool, change in bowel habit, diarrhea and change in bowel habit.   Endocrine: Negative.    Genitourinary: Negative.  Negative for frequency.   Musculoskeletal:  Negative for back pain.   Integumentary:  Negative for rash, breast mass and breast tenderness.   Allergic/Immunologic: Negative.    Neurological: Negative.  Negative for headaches.   Hematological:  Negative for adenopathy.   Psychiatric/Behavioral: Negative.  The patient is not nervous/anxious.    Breast: Negative for mass and tenderness        Past medical history:  Breast cancer, hyperthyroidism, hypocalcemia    Past surgical history:  Right lumpectomy, thyroidectomy,     Family history:  Brother had prostate cancer    Social history:  Social alcohol use, lifetime nonsmoker    Objective:     Vitals:    23 1008   BP: 122/82   Pulse: 78   Resp: 15   Temp: 98.6 °F (37 °C)           Physical Exam  Constitutional:       Appearance: Normal appearance.   HENT:      Head: Normocephalic.      Nose: Nose normal.      Mouth/Throat:      Mouth: Mucous membranes are moist.      Pharynx: No oropharyngeal exudate.   Eyes:      Conjunctiva/sclera: Conjunctivae normal.      Pupils: Pupils are equal, round, and reactive to light.   Cardiovascular:      Rate and Rhythm: Normal rate and regular  rhythm.   Pulmonary:      Effort: Pulmonary effort is normal.      Breath sounds: Normal breath sounds.   Chest:      Comments: Well-healed right periareolar and right axillary incisions.  Stable masslike thickening at 1:00 right breast at the site of previous MammoSite  Abdominal:      General: Bowel sounds are normal.      Palpations: Abdomen is soft.   Musculoskeletal:         General: Normal range of motion.      Cervical back: Normal range of motion.   Lymphadenopathy:      Cervical: No cervical adenopathy.      Upper Body:      Right upper body: No supraclavicular or axillary adenopathy.      Left upper body: No supraclavicular or axillary adenopathy.   Skin:     General: Skin is warm.   Neurological:      General: No focal deficit present.      Mental Status: She is alert and oriented to person, place, and time.   Psychiatric:         Mood and Affect: Mood normal.         Behavior: Behavior normal.       ECOG SCORE    0 - Fully active-able to carry on all pre-disease performance without restriction            Laboratory     Latest Reference Range & Units 08/18/23 07:16   WBC 4.50 - 11.50 x10(3)/mcL 7.00   RBC 4.20 - 5.40 x10(6)/mcL 5.15   Hemoglobin 12.0 - 16.0 g/dL 14.4   Hematocrit 37.0 - 47.0 % 45.5   MCV 80.0 - 94.0 fL 88.3   MCH 27.0 - 31.0 pg 28.0   MCHC 33.0 - 36.0 g/dL 31.6 (L)   RDW 11.5 - 17.0 % 13.3   Platelets 130 - 400 x10(3)/mcL 288   MPV 7.4 - 10.4 fL 10.4   Neut % % 57.1   LYMPH % % 33.1   Mono % % 8.1   Eosinophil % % 1.0   Basophil % % 0.7   Immature Granulocytes % 0.0   Neut # 2.1 - 9.2 x10(3)/mcL 3.99   Lymph # 0.6 - 4.6 x10(3)/mcL 2.32   Mono # 0.1 - 1.3 x10(3)/mcL 0.57   Eos # 0 - 0.9 x10(3)/mcL 0.07   Baso # <=0.2 x10(3)/mcL 0.05   Immature Grans (Abs) 0 - 0.04 x10(3)/mcL 0.00   Sodium 136 - 145 mmol/L 142   Potassium 3.5 - 5.1 mmol/L 4.4   Chloride 98 - 107 mmol/L 100   CO2 23 - 31 mmol/L 31   BUN 9.8 - 20.1 mg/dL 17.5   Creatinine 0.55 - 1.02 mg/dL 0.91   eGFR mls/min/1.73/m2 >60    Glucose 82 - 115 mg/dL 105   Calcium 8.4 - 10.2 mg/dL 9.7   Alkaline Phosphatase 40 - 150 unit/L 97   PROTEIN TOTAL 5.8 - 7.6 gm/dL 6.9   Albumin 3.4 - 4.8 g/dL 4.0   Albumin/Globulin Ratio 1.1 - 2.0 ratio 1.4   BILIRUBIN TOTAL <=1.5 mg/dL 0.9   AST 5 - 34 unit/L 22   ALT 0 - 55 unit/L 18   Globulin, Total 2.4 - 3.5 gm/dL 2.9   CEA 0.00 - 3.00 ng/mL <1.73   Breast Carcinoma Assoc Ag(CA 27.29) <=38.0 U/mL <12.0   (L): Data is abnormally low  Assessment:   Stage I A breast cancer        Plan:   Patient has no suspicious findings on physical exam or laboratory.  She will continue annual breast cancer surveillance in conjunction with her mammogram.  Labs needed prior to return: CBC, CMP, CEA and CA 27-29.  Patient in agreement.  All questions answered to her satisfaction.    JUANI CARSON, FNP-C    Other Physicians  Dr. Trinidad Herman

## 2023-09-21 DIAGNOSIS — I10 HYPERTENSION, UNSPECIFIED TYPE: ICD-10-CM

## 2023-09-21 RX ORDER — HYDROCHLOROTHIAZIDE 25 MG/1
TABLET ORAL
Qty: 90 TABLET | Refills: 0 | Status: SHIPPED | OUTPATIENT
Start: 2023-09-21 | End: 2023-12-20

## 2023-09-25 DIAGNOSIS — E55.9 VITAMIN D DEFICIENCY: ICD-10-CM

## 2023-09-25 RX ORDER — CALCITRIOL 0.5 UG/1
0.5 CAPSULE ORAL 2 TIMES DAILY
Qty: 90 CAPSULE | Refills: 0 | Status: SHIPPED | OUTPATIENT
Start: 2023-09-25 | End: 2023-11-01

## 2023-10-31 ENCOUNTER — TELEPHONE (OUTPATIENT)
Dept: INTERNAL MEDICINE | Facility: CLINIC | Age: 68
End: 2023-10-31
Payer: MEDICARE

## 2023-10-31 NOTE — TELEPHONE ENCOUNTER
----- Message from Laura Trevino MA sent at 10/31/2023  9:57 AM CDT -----  Regarding: Stanley 11/15@2:40PM  1. Are there any outstanding tasks in the patient's chart? Yes, fasting labs    2. Is there any documentation in the chart? No    3.Has patient been seen in an ER, Urgent care clinic, or been admitted since last visit?  If yes, When, where, and why    4. Has patient seen any other healthcare providers since last visit?  If yes, when, where, and why    5. Has patient had any bloodwork or XR done since last visit?    6. Is patient signed up for patient portal?

## 2023-11-01 DIAGNOSIS — E55.9 VITAMIN D DEFICIENCY: ICD-10-CM

## 2023-11-01 RX ORDER — CALCITRIOL 0.5 UG/1
0.5 CAPSULE ORAL 2 TIMES DAILY
Qty: 90 CAPSULE | Refills: 0 | Status: SHIPPED | OUTPATIENT
Start: 2023-11-01 | End: 2023-11-15 | Stop reason: SDUPTHER

## 2023-11-13 ENCOUNTER — LAB VISIT (OUTPATIENT)
Dept: LAB | Facility: HOSPITAL | Age: 68
End: 2023-11-13
Attending: INTERNAL MEDICINE
Payer: MEDICARE

## 2023-11-13 DIAGNOSIS — E83.51 HYPOCALCEMIA: ICD-10-CM

## 2023-11-13 LAB
ALBUMIN SERPL-MCNC: 4.1 G/DL (ref 3.4–4.8)
ALBUMIN/GLOB SERPL: 1.4 RATIO (ref 1.1–2)
ALP SERPL-CCNC: 88 UNIT/L (ref 40–150)
ALT SERPL-CCNC: 18 UNIT/L (ref 0–55)
AST SERPL-CCNC: 19 UNIT/L (ref 5–34)
BILIRUB SERPL-MCNC: 0.7 MG/DL
BUN SERPL-MCNC: 13.8 MG/DL (ref 9.8–20.1)
CALCIUM SERPL-MCNC: 9 MG/DL (ref 8.4–10.2)
CHLORIDE SERPL-SCNC: 100 MMOL/L (ref 98–107)
CO2 SERPL-SCNC: 30 MMOL/L (ref 23–31)
CREAT SERPL-MCNC: 0.91 MG/DL (ref 0.55–1.02)
DEPRECATED CALCIDIOL+CALCIFEROL SERPL-MC: 92.2 NG/ML (ref 30–80)
GFR SERPLBLD CREATININE-BSD FMLA CKD-EPI: >60 MLS/MIN/1.73/M2
GLOBULIN SER-MCNC: 2.9 GM/DL (ref 2.4–3.5)
GLUCOSE SERPL-MCNC: 109 MG/DL (ref 82–115)
POTASSIUM SERPL-SCNC: 4.2 MMOL/L (ref 3.5–5.1)
PROT SERPL-MCNC: 7 GM/DL (ref 5.8–7.6)
SODIUM SERPL-SCNC: 140 MMOL/L (ref 136–145)
TSH SERPL-ACNC: 2.37 UIU/ML (ref 0.35–4.94)

## 2023-11-13 PROCEDURE — 80053 COMPREHEN METABOLIC PANEL: CPT

## 2023-11-13 PROCEDURE — 82306 VITAMIN D 25 HYDROXY: CPT

## 2023-11-13 PROCEDURE — 84443 ASSAY THYROID STIM HORMONE: CPT

## 2023-11-13 PROCEDURE — 36415 COLL VENOUS BLD VENIPUNCTURE: CPT

## 2023-11-15 ENCOUNTER — OFFICE VISIT (OUTPATIENT)
Dept: INTERNAL MEDICINE | Facility: CLINIC | Age: 68
End: 2023-11-15
Payer: MEDICARE

## 2023-11-15 VITALS
RESPIRATION RATE: 16 BRPM | HEIGHT: 66 IN | TEMPERATURE: 98 F | BODY MASS INDEX: 25.88 KG/M2 | WEIGHT: 161 LBS | SYSTOLIC BLOOD PRESSURE: 115 MMHG | DIASTOLIC BLOOD PRESSURE: 75 MMHG | HEART RATE: 71 BPM | OXYGEN SATURATION: 98 %

## 2023-11-15 DIAGNOSIS — Z23 NEED FOR VACCINATION: ICD-10-CM

## 2023-11-15 DIAGNOSIS — I10 PRIMARY HYPERTENSION: ICD-10-CM

## 2023-11-15 DIAGNOSIS — E83.51 HYPOCALCEMIA: ICD-10-CM

## 2023-11-15 DIAGNOSIS — Z90.89 S/P PARATHYROIDECTOMY: ICD-10-CM

## 2023-11-15 DIAGNOSIS — Z78.0 POST-MENOPAUSAL: ICD-10-CM

## 2023-11-15 DIAGNOSIS — C50.919 MALIGNANT NEOPLASM OF FEMALE BREAST, UNSPECIFIED ESTROGEN RECEPTOR STATUS, UNSPECIFIED LATERALITY, UNSPECIFIED SITE OF BREAST: ICD-10-CM

## 2023-11-15 DIAGNOSIS — Z00.00 WELL ADULT EXAM: Primary | ICD-10-CM

## 2023-11-15 DIAGNOSIS — E89.0 POSTOPERATIVE HYPOTHYROIDISM: ICD-10-CM

## 2023-11-15 DIAGNOSIS — Z98.890 S/P PARATHYROIDECTOMY: ICD-10-CM

## 2023-11-15 DIAGNOSIS — E55.9 VITAMIN D DEFICIENCY: ICD-10-CM

## 2023-11-15 DIAGNOSIS — E78.2 MIXED HYPERLIPIDEMIA: ICD-10-CM

## 2023-11-15 PROBLEM — E87.6 HYPOKALEMIA: Status: RESOLVED | Noted: 2022-12-14 | Resolved: 2023-11-15

## 2023-11-15 PROCEDURE — 3078F DIAST BP <80 MM HG: CPT | Mod: CPTII,,, | Performed by: NURSE PRACTITIONER

## 2023-11-15 PROCEDURE — 3288F PR FALLS RISK ASSESSMENT DOCUMENTED: ICD-10-PCS | Mod: CPTII,,, | Performed by: NURSE PRACTITIONER

## 2023-11-15 PROCEDURE — G0439 PPPS, SUBSEQ VISIT: HCPCS | Mod: ,,, | Performed by: NURSE PRACTITIONER

## 2023-11-15 PROCEDURE — 1158F PR ADVANCE CARE PLANNING DISCUSS DOCUMENTED IN MEDICAL RECORD: ICD-10-PCS | Mod: CPTII,,, | Performed by: NURSE PRACTITIONER

## 2023-11-15 PROCEDURE — 1158F ADVNC CARE PLAN TLK DOCD: CPT | Mod: CPTII,,, | Performed by: NURSE PRACTITIONER

## 2023-11-15 PROCEDURE — 90694 FLU VACCINE - QUADRIVALENT - ADJUVANTED: ICD-10-PCS | Mod: ,,, | Performed by: NURSE PRACTITIONER

## 2023-11-15 PROCEDURE — 3074F SYST BP LT 130 MM HG: CPT | Mod: CPTII,,, | Performed by: NURSE PRACTITIONER

## 2023-11-15 PROCEDURE — G0008 FLU VACCINE - QUADRIVALENT - ADJUVANTED: ICD-10-PCS | Mod: ,,, | Performed by: NURSE PRACTITIONER

## 2023-11-15 PROCEDURE — 3078F PR MOST RECENT DIASTOLIC BLOOD PRESSURE < 80 MM HG: ICD-10-PCS | Mod: CPTII,,, | Performed by: NURSE PRACTITIONER

## 2023-11-15 PROCEDURE — 90694 VACC AIIV4 NO PRSRV 0.5ML IM: CPT | Mod: ,,, | Performed by: NURSE PRACTITIONER

## 2023-11-15 PROCEDURE — 3288F FALL RISK ASSESSMENT DOCD: CPT | Mod: CPTII,,, | Performed by: NURSE PRACTITIONER

## 2023-11-15 PROCEDURE — 1159F MED LIST DOCD IN RCRD: CPT | Mod: CPTII,,, | Performed by: NURSE PRACTITIONER

## 2023-11-15 PROCEDURE — 1101F PR PT FALLS ASSESS DOC 0-1 FALLS W/OUT INJ PAST YR: ICD-10-PCS | Mod: CPTII,,, | Performed by: NURSE PRACTITIONER

## 2023-11-15 PROCEDURE — 90677 PCV20 VACCINE IM: CPT | Mod: ,,, | Performed by: NURSE PRACTITIONER

## 2023-11-15 PROCEDURE — 1159F PR MEDICATION LIST DOCUMENTED IN MEDICAL RECORD: ICD-10-PCS | Mod: CPTII,,, | Performed by: NURSE PRACTITIONER

## 2023-11-15 PROCEDURE — 1160F RVW MEDS BY RX/DR IN RCRD: CPT | Mod: CPTII,,, | Performed by: NURSE PRACTITIONER

## 2023-11-15 PROCEDURE — 3074F PR MOST RECENT SYSTOLIC BLOOD PRESSURE < 130 MM HG: ICD-10-PCS | Mod: CPTII,,, | Performed by: NURSE PRACTITIONER

## 2023-11-15 PROCEDURE — 1101F PT FALLS ASSESS-DOCD LE1/YR: CPT | Mod: CPTII,,, | Performed by: NURSE PRACTITIONER

## 2023-11-15 PROCEDURE — 90677 PNEUMOCOCCAL CONJUGATE VACCINE 20-VALENT: ICD-10-PCS | Mod: ,,, | Performed by: NURSE PRACTITIONER

## 2023-11-15 PROCEDURE — G0439 PR MEDICARE ANNUAL WELLNESS SUBSEQUENT VISIT: ICD-10-PCS | Mod: ,,, | Performed by: NURSE PRACTITIONER

## 2023-11-15 PROCEDURE — 1160F PR REVIEW ALL MEDS BY PRESCRIBER/CLIN PHARMACIST DOCUMENTED: ICD-10-PCS | Mod: CPTII,,, | Performed by: NURSE PRACTITIONER

## 2023-11-15 PROCEDURE — G0009 PNEUMOCOCCAL CONJUGATE VACCINE 20-VALENT: ICD-10-PCS | Mod: ,,, | Performed by: NURSE PRACTITIONER

## 2023-11-15 PROCEDURE — G0008 ADMIN INFLUENZA VIRUS VAC: HCPCS | Mod: ,,, | Performed by: NURSE PRACTITIONER

## 2023-11-15 PROCEDURE — G0009 ADMIN PNEUMOCOCCAL VACCINE: HCPCS | Mod: ,,, | Performed by: NURSE PRACTITIONER

## 2023-11-15 RX ORDER — CALCITRIOL 0.5 UG/1
0.5 CAPSULE ORAL 2 TIMES DAILY
Qty: 90 CAPSULE | Refills: 3 | Status: SHIPPED | OUTPATIENT
Start: 2023-11-15 | End: 2024-03-28

## 2023-11-15 NOTE — PROGRESS NOTES
Internal Medicine      Patient ID: [id number]     Chief Complaint: Medicare Annual Wellness     HPI:     Carmen Oh is a 68 y.o. female here today for a Medicare Annual Wellness visit and comprehensive Health Risk Assessment. Reviewed and discussed lab results. Supratherapeutic vitamin D - taking [id number] BID. Overall she feels well.  No other complaints today.     Health Maintenance   Topic Date Due    Hepatitis C Screening  Never done    Shingles Vaccine (2 of 2) 2020    Mammogram  2024    DEXA Scan  2025    Colorectal Cancer Screening  2027    Lipid Panel  2028    TETANUS VACCINE  2031        Past Medical History:   Diagnosis Date    Hypertension 2022    Hypocalcemia     Hypokalemia 2022    Hypothyroid 2022    Malignant neoplasm of breast 2022    Mixed hyperlipidemia 2022    Personal history of colonic polyps 2020    S/P parathyroidectomy         Past Surgical History:   Procedure Laterality Date    BREAST BIOPSY Right     BREAST LUMPECTOMY Right      SECTION      X 2    COLONOSCOPY  2020    ALFONZO Ornelas MD    THYROIDECTOMY          Social History     Socioeconomic History    Marital status:    Occupational History    Occupation: Cafeteria tech at [employer]   Tobacco Use    Smoking status: Never    Smokeless tobacco: Never   Substance and Sexual Activity    Alcohol use: Not Currently     Comment: 1- 2 times/month    Drug use: Never    Sexual activity: Not Currently     Social Determinants of Health     Financial Resource Strain: Low Risk  (11/15/2023)    Overall Financial Resource Strain (CARDIA)     Difficulty of Paying Living Expenses: Not hard at all   Food Insecurity: No Food Insecurity (11/15/2023)    Hunger Vital Sign     Worried About Running Out of Food in the Last Year: Never true     Ran Out of Food in the Last Year: Never true   Transportation Needs: No Transportation Needs  (11/15/2023)    PRAPARE - Transportation     Lack of Transportation (Medical): No     Lack of Transportation (Non-Medical): No   Physical Activity: Sufficiently Active (11/15/2023)    Exercise Vital Sign     Days of Exercise per Week: 4 days     Minutes of Exercise per Session: 40 min   Social Connections: Unknown (11/15/2023)    Social Connection and Isolation Panel [NHANES]     Frequency of Communication with Friends and Family: Never     Frequency of Social Gatherings with Friends and Family: Three times a week     Attends Denominational Services: More than 4 times per year     Active Member of Clubs or Organizations: Yes     Attends Club or Organization Meetings: More than 4 times per year     Marital Status: Patient refused   Housing Stability: Unknown (11/15/2023)    Housing Stability Vital Sign     Unable to Pay for Housing in the Last Year: No     Unstable Housing in the Last Year: No        Family History   Problem Relation Age of Onset    Atrial fibrillation Mother     Heart disease Father     Aneurysm Sister     Prostate cancer Brother     Cervical cancer Maternal Aunt         Current Outpatient Medications   Medication Instructions    atorvastatin (LIPITOR) 40 mg, Oral, Nightly    calcitRIOL (ROCALTROL) 0.5 mcg, Oral, 2 times daily    CALCIUM CITRATE-VITAMIN D3 ORAL 1 capsule, Oral, Daily    hydroCHLOROthiazide (HYDRODIURIL) 25 MG tablet Take 1 tablet by mouth once daily    levocetirizine (XYZAL) 5 MG tablet 1 tablet, Oral, Nightly    levothyroxine (SYNTHROID) 100 mcg, Oral, Before breakfast    pantoprazole (PROTONIX) 40 mg, Oral       Review of patient's allergies indicates:  No Known Allergies     Immunization History   Administered Date(s) Administered    COVID-19, MRNA, LN-S, PF (Pfizer) (Purple Cap) 02/11/2021, 03/04/2021, 12/06/2021, 10/07/2022    COVID-19, mRNA, LNP-S, bivalent booster, PF (Moderna Omicron)12 + YEARS 10/07/2022    Influenza (FLUAD) - Quadrivalent - Adjuvanted - PF *Preferred* (65+)  "11/21/2022, 11/15/2023    Influenza - Trivalent - PF (ADULT) 11/06/2017    Pneumococcal Conjugate - 20 Valent 11/15/2023    Tdap 03/30/2021    Zoster 06/15/2016, 12/28/2016, 12/26/2019, 07/17/2020    Zoster Recombinant 07/17/2020        Patient Care Team:  Tacos Falcon MD as PCP - General (Internal Medicine)  CASSIE Ornelas MD as Consulting Physician (Gastroenterology)  Antonio Willams MD as Consulting Physician (Oncology)    Subjective:     Review of Systems   Constitutional:  Negative for activity change and unexpected weight change.   HENT:  Negative for hearing loss, rhinorrhea and trouble swallowing.    Eyes:  Negative for discharge and visual disturbance.   Respiratory:  Negative for chest tightness and wheezing.    Cardiovascular:  Negative for chest pain and palpitations.   Gastrointestinal:  Negative for blood in stool, constipation, diarrhea and vomiting.   Endocrine: Negative for polydipsia and polyuria.   Genitourinary:  Negative for difficulty urinating, dysuria, hematuria and menstrual problem.   Musculoskeletal:  Negative for arthralgias, joint swelling and neck pain.   Neurological:  Negative for weakness and headaches.   Psychiatric/Behavioral:  Negative for confusion and dysphoric mood.        12 point review of systems conducted, negative except as stated in the history of present illness. See HPI for details.    Objective:     Visit Vitals  /75 (BP Location: Left arm, Patient Position: Sitting, BP Method: Medium (Manual))   Pulse 71   Temp 98 °F (36.7 °C)   Resp 16   Ht 5' 6" (1.676 m)   Wt 73 kg (161 lb)   SpO2 98%   BMI 25.99 kg/m²       Physical Exam  Vitals and nursing note reviewed.   Constitutional:       General: She is not in acute distress.     Appearance: She is not ill-appearing.   HENT:      Head: Normocephalic and atraumatic.      Mouth/Throat:      Mouth: Mucous membranes are moist.      Pharynx: Oropharynx is clear.   Eyes:      General: No scleral " icterus.     Extraocular Movements: Extraocular movements intact.      Conjunctiva/sclera: Conjunctivae normal.      Pupils: Pupils are equal, round, and reactive to light.   Neck:      Vascular: No carotid bruit.   Cardiovascular:      Rate and Rhythm: Normal rate and regular rhythm.      Heart sounds: No murmur heard.     No friction rub. No gallop.   Pulmonary:      Effort: Pulmonary effort is normal. No respiratory distress.      Breath sounds: Normal breath sounds. No wheezing, rhonchi or rales.   Abdominal:      General: Abdomen is flat. Bowel sounds are normal. There is no distension.      Palpations: Abdomen is soft. There is no mass.      Tenderness: There is no abdominal tenderness.   Musculoskeletal:         General: Normal range of motion.      Cervical back: Normal range of motion and neck supple.   Skin:     General: Skin is warm and dry.   Neurological:      General: No focal deficit present.      Mental Status: She is alert.   Psychiatric:         Mood and Affect: Mood normal.         Assessment:       ICD-10-CM ICD-9-CM   1. Well adult exam  Z00.00 V70.0   2. S/P parathyroidectomy  E89.2 252.8   3. Postoperative hypothyroidism  E89.0 244.0   4. Vitamin D deficiency  E55.9 268.9   5. Post-menopausal  Z78.0 V49.81   6. Mixed hyperlipidemia  E78.2 272.2   7. Need for vaccination  Z23 V05.9   8. Hypocalcemia  E83.51 275.41   9. Primary hypertension  I10 401.9   10. Malignant neoplasm of female breast, unspecified estrogen receptor status, unspecified laterality, unspecified site of breast  C50.919 174.9        Plan:     1. Well adult exam  Overview:  Alcohol/Tobacco Use - Stressed importance of limiting alcohol intake.  CVD Risk Factors - Reviewed  Obesity/Physical Activity - Normal BMI. Encouraged daily 30 minute physical activity x 5 days per week.    Cervical Cancer Screening - Last Pap 2022 with Dr. Herman - referred to Dr. King.   Breast Cancer Screening - Last Mammogram on 7/2023. Normal. Follow  up in 1 year   Colon Cancer Screening - Colonoscopy on 11/2020. Follow up in 7 years. Dr. Ornelas.   Osteoporosis Screening - Last DEXA on 1/2022. Results show Normal Bone Density. Follow up in 1/2024.  Eye Exam - Recommend annually.  Dental Exam - Recommend biannually  Vaccinations -   Immunization History   Administered Date(s) Administered    COVID-19, MRNA, LN-S, PF (Pfizer) (Purple Cap) 02/11/2021, 03/04/2021, 12/06/2021, 10/07/2022    COVID-19, mRNA, LNP-S, bivalent booster, PF (Moderna Omicron)12 + YEARS 10/07/2022    Influenza (FLUAD) - Quadrivalent - Adjuvanted - PF *Preferred* (65+) 11/21/2022    Influenza - Trivalent - PF (ADULT) 11/06/2017    Tdap 03/30/2021    Zoster 06/15/2016, 12/28/2016, 12/26/2019, 07/17/2020    Zoster Recombinant 07/17/2020          Orders:  -     Ambulatory referral/consult to Obstetrics / Gynecology; Future; Expected date: 11/22/2023    2. S/P parathyroidectomy  Overview:  1981 -partial thyroidectomy.  Subsequently became hyperthyroid and put on PTU for 7 years then had thyroidectomy of other lobe. Parathyroids not salvaged and patient now on calcium supplementation.      3. Postoperative hypothyroidism  Overview:  1981 - partial thyroidectomy.  Subsequently became hyperthyroid and put on PTU for 7 years then had thyroidectomy of other lobe. Parathyroids not salvaged and patient now on calcium supplementation.    Assessment & Plan:  Lab Results   Component Value Date    TSH 2.373 11/13/2023    [id number] 1.38 03/20/2023      Continue Levothyroxine 100mcg daily.   Take medicine on an empty stomach with water (no other medications or beverages). Wait 30 minutes to eat or drink.  Report any symptoms of thinning hair, breaking nails, fatigue, weight gain or loss, palpitations.         4. Vitamin D deficiency  -     calcitRIOL (ROCALTROL) 0.5 MCG Cap; Take 1 capsule (0.5 mcg total) by mouth 2 (two) times daily.  Dispense: 90 capsule; Refill: 3    5. Post-menopausal  -     DXA Bone  Density Axial Skeleton 1 or more sites; Future; Expected date: 01/15/2024    6. Mixed hyperlipidemia  Assessment & Plan:  Lab Results   Component Value Date    LDL 94.00 03/20/2023    TRIG 136 03/20/2023    HDL 60 03/20/2023    TOTALCHOLEST 3 03/20/2023     Continue Atorvastatin 40mg daily  Stressed importance of dietary modifications. Follow a low cholesterol, low saturated fat diet with less that 200mg of cholesterol a day.  Avoid fried foods and high saturated fats (high saturated fats less than 7% of calories).  Add Flax Seed/Fish Oil supplements to diet. Increase dietary fiber.  Regular exercise can reduce LDL and raise HDL. Stressed importance of physical activity 5 times per week for 30 minutes per day.       Orders:  -     Lipid Panel; Future; Expected date: 11/15/2023    7. Need for vaccination  -     Cancel: Influenza - Quadrivalent - High Dose (65+) (PF) (IM)  -     Pneumococcal Conjugate Vaccine (20 Valent) (IM)(Preferred)  -     Cancel: Influenza - Quadrivalent - High Dose (65+) (PF) (IM)  -     Influenza - Quadrivalent (Adjuvanted)    8. Hypocalcemia  Assessment & Plan:  Currently taking 1200mg TID of Calcium OTC + Calcitriol 0.5mg daily          9. Primary hypertension  Assessment & Plan:  Well controlled.   Continue HCTZ 25mg daily   Low Sodium Diet (DASH Diet - Less than 2 grams of sodium per day).  Monitor blood pressure daily and log. Report consistent numbers greater than 140/90.  Maintain healthy weight with goal BMI <30. Exercise 30 minutes per day, 5 days per week.        10. Malignant neoplasm of female breast, unspecified estrogen receptor status, unspecified laterality, unspecified site of breast  Overview:  Stage IA right breast cancer diagnosed 4/15 (T1a N0 M0), 0.4 cm, grade III, ER/WV +, HER-2 +                  Postmenopausal with intact uterus                  Heterozygous MUTYH mutation  MammoSite XRT completed 5/1/15; adjuvant Herceptin x 1 year completed 5/16; Femara x 5 yrs  (completed 7/20)            The following assessments were completed and reviewed. See completed screening forms and assessments within the Encounter Summary.   [x] Health Risk Assessment   [x] CVD Risk Factors   [x] Obesity/Physical Activity -  Encouraged daily 30 minute physical activity x 5 days per week.   [x] Home Safety/Living Situation   [x] Alcohol Screen  [x] Depression (PHQ) Screen   [x] Timed Get Up and Go   [x] Whisper Test  [x] Cognitive Function/Impairment Screen   [x] Nutrition Screening  [x] ADL Screen   [x] Opioid Screen:  [x] Patient does not have a prescription for opioids.   [] Patient has a prescription for opioids but is at low risk for abuse.   [x] Substance Abuse Screen:   [x] Patient does not use substances.   [] Patient screens positive for substance use disorder.   Advance Care Planning   Advance Care Planning     Date: 11/15/2023    [facility]  I engaged the patient in a voluntary conversation about advance care planning and we specifically addressed what the goals of care would be moving forward.  We did specifically address the patient's likely prognosis, which is good .  We explored the patient's values and preferences for future care.  The patient endorses that what is most important right now is to focus on avoiding the hospital and remaining as independent as possible    Accordingly, we have decided that the best plan to meet the patient's goals includes continuing with treatment       I attest to discussing Advance Care Planning with patient and/or family member.  Education was provided including the importance of the Health Care Power of , Advance Directives, and/or [place] documentation.  The patient expressed understanding to the importance of this information and discussion.       Provided patient with a 5-10 year written screening schedule and personal prevention plan. Recommendations were developed using the USPSTF age appropriate recommendations. Education, counseling, and  referrals were provided as needed. After Visit Summary printed and given to patient, which includes a list of additional screenings\tests needed.    Follow up in about 6 months (around 5/15/2024) for Routine Follow Up. In addition to their scheduled follow up, the patient has also been instructed to follow up on as needed basis.     Future Appointments   Date Time Provider Department Center   1/16/2024  1:00 PM [facility]    5/16/2024  9:40 AM Tacos Falcon MD [id number]   8/16/2024 11:00 AM LAB, [facility] LAB [facility]   8/20/2024 11:00 AM [facility]        REZA De Jesus

## 2023-11-15 NOTE — ASSESSMENT & PLAN NOTE
Lab Results   Component Value Date    TSH 2.373 11/13/2023    [id number] 1.38 03/20/2023      Continue Levothyroxine 100mcg daily.   Take medicine on an empty stomach with water (no other medications or beverages). Wait 30 minutes to eat or drink.  Report any symptoms of thinning hair, breaking nails, fatigue, weight gain or loss, palpitations.

## 2023-11-15 NOTE — ASSESSMENT & PLAN NOTE
Lab Results   Component Value Date    LDL 94.00 03/20/2023    TRIG 136 03/20/2023    HDL 60 03/20/2023    TOTALCHOLEST 3 03/20/2023     Continue Atorvastatin 40mg daily  Stressed importance of dietary modifications. Follow a low cholesterol, low saturated fat diet with less that 200mg of cholesterol a day.  Avoid fried foods and high saturated fats (high saturated fats less than 7% of calories).  Add Flax Seed/Fish Oil supplements to diet. Increase dietary fiber.  Regular exercise can reduce LDL and raise HDL. Stressed importance of physical activity 5 times per week for 30 minutes per day.

## 2023-11-15 NOTE — ASSESSMENT & PLAN NOTE
Well controlled.   Continue HCTZ 25mg daily   Low Sodium Diet (DASH Diet - Less than 2 grams of sodium per day).  Monitor blood pressure daily and log. Report consistent numbers greater than 140/90.  Maintain healthy weight with goal BMI <30. Exercise 30 minutes per day, 5 days per week.

## 2023-11-24 DIAGNOSIS — E03.9 HYPOTHYROIDISM, UNSPECIFIED TYPE: ICD-10-CM

## 2023-11-27 DIAGNOSIS — I10 HYPERTENSION, UNSPECIFIED TYPE: ICD-10-CM

## 2023-11-27 RX ORDER — ATORVASTATIN CALCIUM 40 MG/1
40 TABLET, FILM COATED ORAL NIGHTLY
Qty: 90 TABLET | Refills: 0 | Status: SHIPPED | OUTPATIENT
Start: 2023-11-27 | End: 2024-02-28

## 2023-11-27 RX ORDER — LEVOTHYROXINE SODIUM 100 UG/1
100 TABLET ORAL
Qty: 90 TABLET | Refills: 0 | Status: SHIPPED | OUTPATIENT
Start: 2023-11-27 | End: 2023-11-30

## 2023-11-29 DIAGNOSIS — E03.9 HYPOTHYROIDISM, UNSPECIFIED TYPE: ICD-10-CM

## 2023-11-30 RX ORDER — LEVOTHYROXINE SODIUM 100 UG/1
100 TABLET ORAL
Qty: 90 TABLET | Refills: 0 | Status: SHIPPED | OUTPATIENT
Start: 2023-11-30 | End: 2024-02-28

## 2023-12-04 DIAGNOSIS — K21.9 GASTROESOPHAGEAL REFLUX DISEASE, UNSPECIFIED WHETHER ESOPHAGITIS PRESENT: ICD-10-CM

## 2023-12-04 RX ORDER — PANTOPRAZOLE SODIUM 40 MG/1
40 TABLET, DELAYED RELEASE ORAL
Qty: 90 TABLET | Refills: 0 | Status: SHIPPED | OUTPATIENT
Start: 2023-12-04 | End: 2024-02-28

## 2023-12-20 DIAGNOSIS — I10 HYPERTENSION, UNSPECIFIED TYPE: ICD-10-CM

## 2023-12-20 RX ORDER — HYDROCHLOROTHIAZIDE 25 MG/1
TABLET ORAL
Qty: 90 TABLET | Refills: 0 | Status: SHIPPED | OUTPATIENT
Start: 2023-12-20 | End: 2024-02-28

## 2024-01-16 ENCOUNTER — HOSPITAL ENCOUNTER (OUTPATIENT)
Dept: RADIOLOGY | Facility: HOSPITAL | Age: 69
Discharge: HOME | End: 2024-01-16
Attending: INTERNAL MEDICINE
Payer: MEDICARE

## 2024-01-16 DIAGNOSIS — Z78.0 POSTMENOPAUSAL: ICD-10-CM

## 2024-01-16 PROCEDURE — 77081 DXA BONE DENSITY APPENDICULR: CPT | Mod: 26,,, | Performed by: STUDENT IN AN ORGANIZED HEALTH CARE EDUCATION/TRAINING PROGRAM

## 2024-01-16 PROCEDURE — 77081 DXA BONE DENSITY APPENDICULR: CPT | Mod: TC

## 2024-01-16 PROCEDURE — 77080 DXA BONE DENSITY AXIAL: CPT | Mod: 26,XU,, | Performed by: STUDENT IN AN ORGANIZED HEALTH CARE EDUCATION/TRAINING PROGRAM

## 2024-01-16 PROCEDURE — 77080 DXA BONE DENSITY AXIAL: CPT | Mod: XU,TC

## 2024-02-06 ENCOUNTER — TELEPHONE (OUTPATIENT)
Dept: INTERNAL MEDICINE | Facility: CLINIC | Age: 69
End: 2024-02-06
Payer: MEDICARE

## 2024-02-06 NOTE — TELEPHONE ENCOUNTER
Spoke to pt to let her know that the referral was sent to Dr. Yovani Lamb. She requested that I send her a portal message with Fr. Lamb phone number. Portal message sent.

## 2024-02-06 NOTE — TELEPHONE ENCOUNTER
----- Message from Carmen Nielsen sent at 2/6/2024 12:43 PM CST -----  Type:  Needs Medical Advice    Who Called: Carmen    Would the patient rather a call back or a response via [facility]? Call back     Best Call Back Number: [phone]    Additional Information: Carmen is checking on the status of a gyn Dr. She stated Dr Geronimo advised her to check back with the office.

## 2024-02-19 PROBLEM — Z00.00 WELL ADULT EXAM: Status: RESOLVED | Noted: 2022-09-14 | Resolved: 2024-02-19

## 2024-02-27 ENCOUNTER — PATIENT MESSAGE (OUTPATIENT)
Dept: INTERNAL MEDICINE | Facility: CLINIC | Age: 69
End: 2024-02-27
Payer: MEDICARE

## 2024-02-27 ENCOUNTER — TELEPHONE (OUTPATIENT)
Dept: INTERNAL MEDICINE | Facility: CLINIC | Age: 69
End: 2024-02-27
Payer: MEDICARE

## 2024-02-27 ENCOUNTER — HOSPITAL ENCOUNTER (EMERGENCY)
Facility: HOSPITAL | Age: 69
Discharge: HOME | End: 2024-02-27
Attending: EMERGENCY MEDICINE
Payer: MEDICARE

## 2024-02-27 VITALS
WEIGHT: 160 LBS | DIASTOLIC BLOOD PRESSURE: 88 MMHG | HEART RATE: 77 BPM | SYSTOLIC BLOOD PRESSURE: 133 MMHG | OXYGEN SATURATION: 96 % | TEMPERATURE: 98 F | HEIGHT: 66 IN | RESPIRATION RATE: 14 BRPM | BODY MASS INDEX: 25.71 KG/M2

## 2024-02-27 DIAGNOSIS — K21.9 GASTROESOPHAGEAL REFLUX DISEASE, UNSPECIFIED WHETHER ESOPHAGITIS PRESENT: ICD-10-CM

## 2024-02-27 DIAGNOSIS — E83.51 HYPOCALCEMIA: ICD-10-CM

## 2024-02-27 DIAGNOSIS — E83.51 HYPOCALCEMIA: Primary | ICD-10-CM

## 2024-02-27 DIAGNOSIS — R25.2 CRAMPS, MUSCLE, GENERAL: ICD-10-CM

## 2024-02-27 DIAGNOSIS — E83.51 LOW CALCIUM LEVELS: ICD-10-CM

## 2024-02-27 DIAGNOSIS — E83.42 HYPOMAGNESEMIA: ICD-10-CM

## 2024-02-27 DIAGNOSIS — I10 HYPERTENSION, UNSPECIFIED TYPE: ICD-10-CM

## 2024-02-27 DIAGNOSIS — E86.0 DEHYDRATION: Primary | ICD-10-CM

## 2024-02-27 DIAGNOSIS — E87.6 HYPOKALEMIA: ICD-10-CM

## 2024-02-27 LAB
ANION GAP SERPL CALC-SCNC: 12 MEQ/L
BNP BLD-MCNC: 45.6 PG/ML
BUN SERPL-MCNC: 9.4 MG/DL (ref 9.8–20.1)
CALCIUM SERPL-MCNC: 7.3 MG/DL (ref 8.4–10.2)
CHLORIDE SERPL-SCNC: 100 MMOL/L (ref 98–107)
CO2 SERPL-SCNC: 29 MMOL/L (ref 23–31)
CREAT SERPL-MCNC: 0.86 MG/DL (ref 0.55–1.02)
CREAT/UREA NIT SERPL: 11
GFR SERPLBLD CREATININE-BSD FMLA CKD-EPI: >60 MLS/MIN/1.73/M2
GLUCOSE SERPL-MCNC: 147 MG/DL (ref 82–115)
MAGNESIUM SERPL-MCNC: 1.5 MG/DL (ref 1.6–2.6)
POTASSIUM SERPL-SCNC: 2.9 MMOL/L (ref 3.5–5.1)
SODIUM SERPL-SCNC: 141 MMOL/L (ref 136–145)

## 2024-02-27 PROCEDURE — 25000003 PHARM REV CODE 250: Performed by: EMERGENCY MEDICINE

## 2024-02-27 PROCEDURE — 96368 THER/DIAG CONCURRENT INF: CPT

## 2024-02-27 PROCEDURE — 96365 THER/PROPH/DIAG IV INF INIT: CPT

## 2024-02-27 PROCEDURE — 83735 ASSAY OF MAGNESIUM: CPT | Performed by: EMERGENCY MEDICINE

## 2024-02-27 PROCEDURE — 83880 ASSAY OF NATRIURETIC PEPTIDE: CPT | Performed by: EMERGENCY MEDICINE

## 2024-02-27 PROCEDURE — 99284 EMERGENCY DEPT VISIT MOD MDM: CPT | Mod: 25

## 2024-02-27 PROCEDURE — 63600175 PHARM REV CODE 636 W HCPCS: Mod: JZ,JG | Performed by: EMERGENCY MEDICINE

## 2024-02-27 RX ORDER — LANOLIN ALCOHOL/MO/W.PET/CERES
400 CREAM (GRAM) TOPICAL DAILY
Qty: 7 TABLET | Refills: 0 | Status: SHIPPED | OUTPATIENT
Start: 2024-02-27 | End: 2024-03-05

## 2024-02-27 RX ORDER — MAGNESIUM SULFATE HEPTAHYDRATE 40 MG/ML
2 INJECTION, SOLUTION INTRAVENOUS
Status: COMPLETED | OUTPATIENT
Start: 2024-02-27 | End: 2024-02-27

## 2024-02-27 RX ORDER — POTASSIUM CHLORIDE 20 MEQ/1
20 TABLET, EXTENDED RELEASE ORAL 2 TIMES DAILY
Qty: 14 TABLET | Refills: 0 | Status: SHIPPED | OUTPATIENT
Start: 2024-02-27 | End: 2024-02-29

## 2024-02-27 RX ORDER — POTASSIUM CHLORIDE 20 MEQ/1
40 TABLET, EXTENDED RELEASE ORAL
Status: COMPLETED | OUTPATIENT
Start: 2024-02-27 | End: 2024-02-27

## 2024-02-27 RX ADMIN — MAGNESIUM SULFATE HEPTAHYDRATE 2 G: 40 INJECTION, SOLUTION INTRAVENOUS at 02:02

## 2024-02-27 RX ADMIN — CALCIUM GLUCONATE 1 G: 98 INJECTION, SOLUTION INTRAVENOUS at 02:02

## 2024-02-27 RX ADMIN — POTASSIUM CHLORIDE 40 MEQ: 1500 TABLET, EXTENDED RELEASE ORAL at 02:02

## 2024-02-27 RX ADMIN — SODIUM CHLORIDE, POTASSIUM CHLORIDE, SODIUM LACTATE AND CALCIUM CHLORIDE 1000 ML: 600; 310; 30; 20 INJECTION, SOLUTION INTRAVENOUS at 02:02

## 2024-02-27 NOTE — TELEPHONE ENCOUNTER
"Saima from lab called stating that pt had a critical low calcium of 6.8. Per Dr. Geronimo,"pt needs to present to ED for low calcium."  I spoke to pt and she is going to go to [facility] ED.   "

## 2024-02-27 NOTE — ED PROVIDER NOTES
Encounter Date: 2024       History     Chief Complaint   Patient presents with    Abnormal Labs     Pt had labs this morning; sent to ED by dr mackay for calcium of 6.8; hx of parathyroidectomy; states that she had vomiting and diarrhea this weekend     The history is provided by the patient and the spouse.   General Illness   The current episode started today. The problem occurs rarely. The problem has been unchanged. Nothing relieves the symptoms. Nothing aggravates the symptoms. Pertinent negatives include no fever, no nausea, no sore throat, no shortness of breath and no rash.   Presents with facial paresthesia and leg weakness, similar to prior episodes of hypocalcemia.  Takes calcitriol daily for secondary hypoparathyroidism.  Reports viral gastroenteritis over the past 2 days, and thinks she was not absorbing her medications.  Had outpatient labs this AM that demonstrated low Ca++ and low K+.  Directed to ED for evaluation.    Review of patient's allergies indicates:  No Known Allergies  Past Medical History:   Diagnosis Date    Hypertension 2022    Hypocalcemia     Hypokalemia 2022    Hypothyroid 2022    Malignant neoplasm of breast 2022    Mixed hyperlipidemia 2022    Personal history of colonic polyps 2020    S/P parathyroidectomy      Past Surgical History:   Procedure Laterality Date    BREAST BIOPSY Right     BREAST LUMPECTOMY Right      SECTION      X 2    COLONOSCOPY  2020    ALFONZO Ornelas MD    THYROIDECTOMY       Family History   Problem Relation Age of Onset    Atrial fibrillation Mother     Heart disease Father     Aneurysm Sister     Prostate cancer Brother     Cervical cancer Maternal Aunt      Social History     Tobacco Use    Smoking status: Never    Smokeless tobacco: Never   Substance Use Topics    Alcohol use: Not Currently     Comment: 1- 2 times/month    Drug use: Never     Review of Systems   Constitutional:  Negative for  fever.   HENT:  Negative for sore throat.    Respiratory:  Negative for shortness of breath.    Cardiovascular:  Negative for chest pain.   Gastrointestinal:  Negative for nausea.   Genitourinary:  Negative for dysuria.   Musculoskeletal:  Negative for back pain.   Skin:  Negative for rash.   Neurological:  Negative for weakness.   Hematological:  Does not bruise/bleed easily.       Physical Exam     Initial Vitals [02/27/24 1407]   BP Pulse Resp Temp SpO2   (!) 137/95 84 18 97.7 °F (36.5 °C) 97 %      MAP       --         Physical Exam    Nursing note and vitals reviewed.  Constitutional: She appears well-developed and well-nourished.   HENT:   Head: Normocephalic and atraumatic.   Right Ear: External ear normal.   Left Ear: External ear normal.   Eyes: Conjunctivae and EOM are normal. Pupils are equal, round, and reactive to light.   Neck: Neck supple.   Normal range of motion.  Cardiovascular:  Normal rate, regular rhythm, normal heart sounds and intact distal pulses.           Pulmonary/Chest: Breath sounds normal.   Abdominal: Abdomen is soft. Bowel sounds are normal.   Musculoskeletal:         General: Normal range of motion.      Cervical back: Normal range of motion and neck supple.     Neurological: She is alert and oriented to person, place, and time. GCS score is 15. GCS eye subscore is 4. GCS verbal subscore is 5. GCS motor subscore is 6.   Absent Chvostek's sign   Skin: Skin is warm and dry. Capillary refill takes less than 2 seconds.   Psychiatric: She has a normal mood and affect. Her behavior is normal. Judgment and thought content normal.         ED Course   Procedures  Labs Reviewed   MAGNESIUM - Abnormal; Notable for the following components:       Result Value    Magnesium Level 1.50 (*)     All other components within normal limits   BASIC METABOLIC PANEL - Abnormal; Notable for the following components:    Potassium Level 2.9 (*)     Glucose Level 147 (*)     Blood Urea Nitrogen 9.4 (*)      Calcium Level Total 7.3 (*)     All other components within normal limits   B-TYPE NATRIURETIC PEPTIDE - Normal          Imaging Results    None          Medications   lactated ringers bolus 1,000 mL (0 mLs Intravenous Stopped 2/27/24 1534)   calcium gluconate 1 g in dextrose 5 % in water (D5W) 50 mL IVPB (MB+) (0 g Intravenous Stopped 2/27/24 1538)   magnesium sulfate 2g in water 50mL IVPB (premix) (0 g Intravenous Stopped 2/27/24 1538)   potassium chloride SA CR tablet 40 mEq (40 mEq Oral Given 2/27/24 1439)     Medical Decision Making  Amount and/or Complexity of Data Reviewed  External Data Reviewed: labs.     Details: Labs from this AM:    02/27/24 11:21  Sodium: 141  Potassium: 3.2 (L)  Chloride: 99  CO2: 30  BUN: 9.3 (L)  Creatinine: 0.79  eGFR: >60  Glucose: 102  Calcium: 6.8 (LL)  Magnesium : 1.60  ALP: 79  PROTEIN TOTAL: 6.4  Albumin: 3.6  Albumin/Globulin Ratio: 1.3  BILIRUBIN TOTAL: 0.5  AST: 38 (H)  ALT: 29  Globulin, Total: 2.8      (LL): Data is critically low  (L): Data is abnormally low  (H): Data is abnormally high  Labs: ordered.    Risk  Prescription drug management.               ED Course as of 02/27/24 1642   Tue Feb 27, 2024   1639 Infusions all complete.  Patient feeling much better.  I contacted Dr. Geronimo and her office has already contacted the patient about a F/U appointment in 2 days.   Will D/C with PO magnesium and potassium supplements. [CL]      ED Course User Index  [CL] Juanito Tavera MD          Differential includes:  dehydration, malabsorption, lab error.  Will give IVF bolus and calcium gluconate, magnesium sulfate riders.  Also give oral potassium.  GI symptoms have resolved, so I think once we get her hydrated and electrolytes repleted, she will be stable for D/C home and repeat outpatient labs in a few days.                 Clinical Impression:  Final diagnoses:  [E86.0] Dehydration (Primary)  [E87.6] Hypokalemia  [E83.42] Hypomagnesemia  [E83.51] Hypocalcemia           ED Disposition Condition    Discharge Stable          ED Prescriptions       Medication Sig Dispense Start Date End Date Auth. Provider    potassium chloride SA (K-DUR,KLOR-CON) 20 MEQ tablet Take 1 tablet (20 mEq total) by mouth 2 (two) times daily. for 7 days 14 tablet 2/27/2024 3/5/2024 Juanito Tavera MD    magnesium oxide (MAG-OX) 400 mg (241.3 mg magnesium) tablet Take 1 tablet (400 mg total) by mouth once daily. for 7 days 7 tablet 2/27/2024 3/5/2024 Juanito Tavera MD          Follow-up Information       Follow up With Specialties Details Why Contact Info    Tacos Falcon MD Internal Medicine In 2 days  [address] [facility] [address]  [phone]               Juanito Tavera MD  02/27/24 5939

## 2024-02-27 NOTE — TELEPHONE ENCOUNTER
Krystle Foster Staff  Caller: Unspecified (Today,  8:54 AM)  Type:  Needs Medical Advice    Who Called: pt    Would the patient rather a call back or a response via [facility]? C/b  Best Call Back Number: [phone]    Additional Information: pt has had a virus and vomited and had diarrhea and feels her calcium is critical and is concerned about the fasting    Please contact pt

## 2024-02-28 DIAGNOSIS — E03.9 HYPOTHYROIDISM, UNSPECIFIED TYPE: ICD-10-CM

## 2024-02-28 DIAGNOSIS — K21.9 GASTROESOPHAGEAL REFLUX DISEASE, UNSPECIFIED WHETHER ESOPHAGITIS PRESENT: ICD-10-CM

## 2024-02-28 DIAGNOSIS — I10 HYPERTENSION, UNSPECIFIED TYPE: ICD-10-CM

## 2024-02-28 RX ORDER — ATORVASTATIN CALCIUM 40 MG/1
40 TABLET, FILM COATED ORAL NIGHTLY
Qty: 90 TABLET | Refills: 0 | Status: SHIPPED | OUTPATIENT
Start: 2024-02-28 | End: 2024-02-28

## 2024-02-28 RX ORDER — ATORVASTATIN CALCIUM 40 MG/1
40 TABLET, FILM COATED ORAL NIGHTLY
Qty: 90 TABLET | Refills: 0 | Status: SHIPPED | OUTPATIENT
Start: 2024-02-28

## 2024-02-28 RX ORDER — PANTOPRAZOLE SODIUM 40 MG/1
40 TABLET, DELAYED RELEASE ORAL
Qty: 90 TABLET | Refills: 0 | Status: SHIPPED | OUTPATIENT
Start: 2024-02-28 | End: 2024-02-28

## 2024-02-28 RX ORDER — HYDROCHLOROTHIAZIDE 25 MG/1
TABLET ORAL
Qty: 90 TABLET | Refills: 0 | Status: SHIPPED | OUTPATIENT
Start: 2024-02-28 | End: 2024-06-17

## 2024-02-28 RX ORDER — PANTOPRAZOLE SODIUM 40 MG/1
40 TABLET, DELAYED RELEASE ORAL
Qty: 90 TABLET | Refills: 0 | Status: SHIPPED | OUTPATIENT
Start: 2024-02-28

## 2024-02-28 RX ORDER — LEVOTHYROXINE SODIUM 100 UG/1
100 TABLET ORAL
Qty: 90 TABLET | Refills: 0 | Status: SHIPPED | OUTPATIENT
Start: 2024-02-28

## 2024-02-29 ENCOUNTER — LAB VISIT (OUTPATIENT)
Dept: LAB | Facility: HOSPITAL | Age: 69
End: 2024-02-29
Attending: INTERNAL MEDICINE
Payer: MEDICARE

## 2024-02-29 ENCOUNTER — OFFICE VISIT (OUTPATIENT)
Dept: INTERNAL MEDICINE | Facility: CLINIC | Age: 69
End: 2024-02-29
Payer: MEDICARE

## 2024-02-29 VITALS
OXYGEN SATURATION: 98 % | SYSTOLIC BLOOD PRESSURE: 130 MMHG | BODY MASS INDEX: 26.2 KG/M2 | WEIGHT: 163 LBS | HEART RATE: 80 BPM | RESPIRATION RATE: 16 BRPM | DIASTOLIC BLOOD PRESSURE: 82 MMHG | TEMPERATURE: 97 F | HEIGHT: 66 IN

## 2024-02-29 DIAGNOSIS — E83.51 LOW CALCIUM LEVELS: ICD-10-CM

## 2024-02-29 DIAGNOSIS — E83.51 HYPOCALCEMIA: Primary | ICD-10-CM

## 2024-02-29 DIAGNOSIS — R25.2 CRAMPS, MUSCLE, GENERAL: ICD-10-CM

## 2024-02-29 DIAGNOSIS — E83.51 HYPOCALCEMIA: ICD-10-CM

## 2024-02-29 LAB
ANION GAP SERPL CALC-SCNC: 9 MEQ/L
BUN SERPL-MCNC: 8.9 MG/DL (ref 9.8–20.1)
CALCIUM SERPL-MCNC: 8.1 MG/DL (ref 8.4–10.2)
CHLORIDE SERPL-SCNC: 104 MMOL/L (ref 98–107)
CO2 SERPL-SCNC: 29 MMOL/L (ref 23–31)
CREAT SERPL-MCNC: 0.81 MG/DL (ref 0.55–1.02)
CREAT/UREA NIT SERPL: 11
GFR SERPLBLD CREATININE-BSD FMLA CKD-EPI: >60 MLS/MIN/1.73/M2
GLUCOSE SERPL-MCNC: 116 MG/DL (ref 82–115)
POTASSIUM SERPL-SCNC: 4.9 MMOL/L (ref 3.5–5.1)
SODIUM SERPL-SCNC: 142 MMOL/L (ref 136–145)

## 2024-02-29 PROCEDURE — 3008F BODY MASS INDEX DOCD: CPT | Mod: CPTII,,, | Performed by: INTERNAL MEDICINE

## 2024-02-29 PROCEDURE — 3288F FALL RISK ASSESSMENT DOCD: CPT | Mod: CPTII,,, | Performed by: INTERNAL MEDICINE

## 2024-02-29 PROCEDURE — 36415 COLL VENOUS BLD VENIPUNCTURE: CPT

## 2024-02-29 PROCEDURE — 3075F SYST BP GE 130 - 139MM HG: CPT | Mod: CPTII,,, | Performed by: INTERNAL MEDICINE

## 2024-02-29 PROCEDURE — 80048 BASIC METABOLIC PNL TOTAL CA: CPT

## 2024-02-29 PROCEDURE — 1160F RVW MEDS BY RX/DR IN RCRD: CPT | Mod: CPTII,,, | Performed by: INTERNAL MEDICINE

## 2024-02-29 PROCEDURE — 99214 OFFICE O/P EST MOD 30 MIN: CPT | Mod: ,,, | Performed by: INTERNAL MEDICINE

## 2024-02-29 PROCEDURE — 3079F DIAST BP 80-89 MM HG: CPT | Mod: CPTII,,, | Performed by: INTERNAL MEDICINE

## 2024-02-29 PROCEDURE — 1159F MED LIST DOCD IN RCRD: CPT | Mod: CPTII,,, | Performed by: INTERNAL MEDICINE

## 2024-02-29 PROCEDURE — 1101F PT FALLS ASSESS-DOCD LE1/YR: CPT | Mod: CPTII,,, | Performed by: INTERNAL MEDICINE

## 2024-02-29 NOTE — ASSESSMENT & PLAN NOTE
Patient has hypocalcemia due to Hypoparathyroidism which is currently uncontrolled, and has been confirmed with ionized and/or corrected calcium.  We replaced her calcium she must remain on calcitriol 0.5 mcg twice a day.  Under no circumstances should this be decreased; the patient has been made aware of this and will let me know of any other providers plan to change her medication regimen

## 2024-02-29 NOTE — PROGRESS NOTES
Internal Medicine    Patient ID: [id number]     Chief Complaint: Hospital Follow Up      HPI:     Carmen Oh is a 68 y.o. female here today for a follow up.   Dr. Willams/oncology-history of breast cancer; 7 year out; and is now going to be seen every July.   Dr. Camacho/endocrinology--thyroid lump one half of thyroid then had partial thyroidectomy. Subsequently became hyperthyroid and put on PTU for 7 years then had thyroidectomy of other lobe. Parathyroids not salvaged and patient now on calcium supplementation.  Dr. Herman/GYN, up-to-date with Pap and mammogram  Dr. Ornelas/GI, C scope in , GERD   LDL at 94 on Crestor  On calcitriol now BID, prior to 2022 she had a calcium of 7.3  Now she is at 9  Advised to not take more than 6 tums a day; since she has been on the calcitiol 2 times a day she is not having to take any tums  Caltrate 600 BID    Todays update:   She had a stomach virus  Calcitrol 0.5 mcg was increased to BID on 2022  Back in  is when Stanley told her to take it continue Calcitriol every morning and take it ever other night instead of nightly based on a vitamin D level of 90.     After her virus and subsequent calcium infusion her calcium is back to goal   No complaints today         Past Medical History:   Diagnosis Date    Hypertension 2022    Hypocalcemia     Hypokalemia 2022    Hypothyroid 2022    Malignant neoplasm of breast 2022    Mixed hyperlipidemia 2022    Personal history of colonic polyps 2020    S/P parathyroidectomy         Past Surgical History:   Procedure Laterality Date    BREAST BIOPSY Right     BREAST LUMPECTOMY Right      SECTION      X 2    COLONOSCOPY  2020    ALFONZO Ornelas MD    THYROIDECTOMY          Social History     Tobacco Use    Smoking status: Never    Smokeless tobacco: Never   Substance and Sexual Activity    Alcohol use: Not Currently     Comment: 1- 2 times/month    Drug use:  "Never    Sexual activity: Not Currently        Current Outpatient Medications   Medication Instructions    atorvastatin (LIPITOR) 40 mg, Oral, Nightly    calcitRIOL (ROCALTROL) 0.5 mcg, Oral, 2 times daily    CALCIUM CITRATE-VITAMIN D3 ORAL 1 capsule, Oral, 2 times daily    hydroCHLOROthiazide (HYDRODIURIL) 25 MG tablet Take 1 tablet by mouth once daily    levocetirizine (XYZAL) 5 MG tablet 1 tablet, Oral, Nightly    levothyroxine (SYNTHROID) 100 mcg, Oral, Before breakfast    magnesium oxide (MAG-OX) 400 mg, Oral, Daily    pantoprazole (PROTONIX) 40 mg, Oral       Review of patient's allergies indicates:  No Known Allergies     Patient Care Team:  Tacos Falcon MD as PCP - General (Internal Medicine)  CASSIE Ornelas MD as Consulting Physician (Gastroenterology)  Antonio Willams MD as Consulting Physician (Oncology)     Subjective:     Review of Systems    12 point review of systems conducted, negative except as stated in the history of present illness. See HPI for details.    Objective:     Visit Vitals  /82 (BP Location: Left arm, Patient Position: Sitting, BP Method: Medium (Manual))   Pulse 80   Temp 97.3 °F (36.3 °C) (Temporal)   Resp 16   Ht 5' 6" (1.676 m)   Wt 73.9 kg (163 lb)   SpO2 98%   BMI 26.31 kg/m²       Physical Exam  General : Alert and oriented, No acute distress, afebrile.  Eye : PERRLA. EOMI. Normal conjunctiva, Sclerae are nonicteric.   Respiratory : Respirations are non-labored and clear to auscultation bilaterally. Symmetrical air entry bilaterally, no crackles, no wheezes, no rhonchi. No cyanosis, no clubbing.  Cardiovascular : Normal rate, Regular rhythm. No murmurs, rubs, or gallops. Pulses are 2+ throughout. No JVD. No Edema.  Gastrointestinal : Soft, nontender, non-distended, bowel sounds are present in all quadrants, no organomegaly, no guarding, no rebound.  Musculoskeletal : Normal range of motion throughout. No muscle tenderness.  Integumentary : Warm, moist, " intact.  Neurologic : Alert, Oriented, no FND  Psychiatric : Cooperative, Appropriate mood & affect.   Labs Reviewed:     Chemistry:  Lab Results   Component Value Date     02/29/2024    K 4.9 02/29/2024    CHLORIDE 104 02/29/2024    BUN 8.9 (L) 02/29/2024    CREATININE 0.81 02/29/2024    EGFRNORACEVR >60 02/29/2024    GLUCOSE 116 (H) 02/29/2024    CALCIUM 8.1 (L) 02/29/2024    ALKPHOS 79 02/27/2024    LABPROT 6.4 02/27/2024    ALBUMIN 3.6 02/27/2024    BILIDIR 0.3 01/19/2022    IBILI 0.40 01/19/2022    AST 38 (H) 02/27/2024    ALT 29 02/27/2024    MG 1.50 (L) 02/27/2024    PHOS 5.1 (H) 06/16/2017    [id number] 92.2 (H) 11/13/2023    TSH 2.373 11/13/2023    [id number] 1.38 03/20/2023        Lab Results   Component Value Date    HGBA1C 6.0 04/19/2021        Hematology:  Lab Results   Component Value Date    WBC 7.00 08/18/2023    HGB 14.4 08/18/2023    HCT 45.5 08/18/2023     08/18/2023       Lipid Panel:  Lab Results   Component Value Date    CHOL 181 03/20/2023    HDL 60 03/20/2023    LDL 94.00 03/20/2023    TRIG 136 03/20/2023    TOTALCHOLEST 3 03/20/2023        Urine:  Lab Results   Component Value Date    COLORUA Yellow 03/20/2023    APPEARANCEUA Clear 03/20/2023    SGUA 1.019 03/20/2023    PHUA 7.0 03/20/2023    PROTEINUA Negative 03/20/2023    GLUCOSEUA Negative 03/20/2023    KETONESUA Negative 03/20/2023    BLOODUA Negative 03/20/2023    NITRITESUA Negative 03/20/2023    LEUKOCYTESUR Trace (A) 03/20/2023    RBCUA 0-5 03/20/2023    WBCUA <5 03/20/2023    BACTERIA None Seen 03/20/2023        Assessment:       ICD-10-CM ICD-9-CM   1. Hypocalcemia  E83.51 275.41        Plan:     1. Hypocalcemia  Assessment & Plan:  Patient has hypocalcemia due to Hypoparathyroidism which is currently uncontrolled, and has been confirmed with ionized and/or corrected calcium.  We replaced her calcium she must remain on calcitriol 0.5 mcg twice a day.  Under no circumstances should this be decreased; the patient has  been made aware of this and will let me know of any other providers plan to change her medication regimen               Follow up in about 4 weeks (around 3/28/2024) for with labs prior to visit- YONIS. In addition to their scheduled follow up, the patient has also been instructed to follow up on as needed basis.     Future Appointments   Date Time Provider Department Center   3/28/2024  3:20 PM Tacos Falcon MD [facility] [address] [id number]   5/16/2024  9:40 AM Tacos Falcon MD [facility] [address] [id number]   [facility]2024 [facility]   8/20/2024 11:00 AM Antonio Willams MD [facility]        Tacos Falcon MD

## 2024-03-14 DIAGNOSIS — E83.51 HYPOCALCEMIA: Primary | ICD-10-CM

## 2024-03-20 ENCOUNTER — TELEPHONE (OUTPATIENT)
Dept: INTERNAL MEDICINE | Facility: CLINIC | Age: 69
End: 2024-03-20
Payer: MEDICARE

## 2024-03-28 ENCOUNTER — OFFICE VISIT (OUTPATIENT)
Dept: INTERNAL MEDICINE | Facility: CLINIC | Age: 69
End: 2024-03-28
Payer: MEDICARE

## 2024-03-28 ENCOUNTER — LAB VISIT (OUTPATIENT)
Dept: LAB | Facility: HOSPITAL | Age: 69
End: 2024-03-28
Attending: INTERNAL MEDICINE
Payer: MEDICARE

## 2024-03-28 VITALS
WEIGHT: 164 LBS | HEART RATE: 67 BPM | OXYGEN SATURATION: 98 % | DIASTOLIC BLOOD PRESSURE: 76 MMHG | BODY MASS INDEX: 26.36 KG/M2 | TEMPERATURE: 98 F | SYSTOLIC BLOOD PRESSURE: 120 MMHG | RESPIRATION RATE: 16 BRPM | HEIGHT: 66 IN

## 2024-03-28 DIAGNOSIS — E83.51 HYPOCALCEMIA: ICD-10-CM

## 2024-03-28 DIAGNOSIS — E55.9 VITAMIN D DEFICIENCY: ICD-10-CM

## 2024-03-28 DIAGNOSIS — E83.51 HYPOCALCEMIA: Primary | ICD-10-CM

## 2024-03-28 LAB
ALBUMIN SERPL-MCNC: 4.1 G/DL (ref 3.4–4.8)
ALBUMIN/GLOB SERPL: 1.4 RATIO (ref 1.1–2)
ALP SERPL-CCNC: 91 UNIT/L (ref 40–150)
ALT SERPL-CCNC: 21 UNIT/L (ref 0–55)
AST SERPL-CCNC: 22 UNIT/L (ref 5–34)
BILIRUB SERPL-MCNC: 0.8 MG/DL
BUN SERPL-MCNC: 16.6 MG/DL (ref 9.8–20.1)
CALCIUM SERPL-MCNC: 9.8 MG/DL (ref 8.4–10.2)
CHLORIDE SERPL-SCNC: 103 MMOL/L (ref 98–107)
CO2 SERPL-SCNC: 30 MMOL/L (ref 23–31)
CREAT SERPL-MCNC: 0.9 MG/DL (ref 0.55–1.02)
GFR SERPLBLD CREATININE-BSD FMLA CKD-EPI: >60 MLS/MIN/1.73/M2
GLOBULIN SER-MCNC: 2.9 GM/DL (ref 2.4–3.5)
GLUCOSE SERPL-MCNC: 113 MG/DL (ref 82–115)
POTASSIUM SERPL-SCNC: 4.8 MMOL/L (ref 3.5–5.1)
PROT SERPL-MCNC: 7 GM/DL (ref 5.8–7.6)
SODIUM SERPL-SCNC: 142 MMOL/L (ref 136–145)

## 2024-03-28 PROCEDURE — 3078F DIAST BP <80 MM HG: CPT | Mod: CPTII,,, | Performed by: INTERNAL MEDICINE

## 2024-03-28 PROCEDURE — 3008F BODY MASS INDEX DOCD: CPT | Mod: CPTII,,, | Performed by: INTERNAL MEDICINE

## 2024-03-28 PROCEDURE — 36415 COLL VENOUS BLD VENIPUNCTURE: CPT

## 2024-03-28 PROCEDURE — 3074F SYST BP LT 130 MM HG: CPT | Mod: CPTII,,, | Performed by: INTERNAL MEDICINE

## 2024-03-28 PROCEDURE — 80053 COMPREHEN METABOLIC PANEL: CPT

## 2024-03-28 PROCEDURE — 1101F PT FALLS ASSESS-DOCD LE1/YR: CPT | Mod: CPTII,,, | Performed by: INTERNAL MEDICINE

## 2024-03-28 PROCEDURE — 3288F FALL RISK ASSESSMENT DOCD: CPT | Mod: CPTII,,, | Performed by: INTERNAL MEDICINE

## 2024-03-28 PROCEDURE — 1159F MED LIST DOCD IN RCRD: CPT | Mod: CPTII,,, | Performed by: INTERNAL MEDICINE

## 2024-03-28 PROCEDURE — 99213 OFFICE O/P EST LOW 20 MIN: CPT | Mod: ,,, | Performed by: INTERNAL MEDICINE

## 2024-03-28 PROCEDURE — 1160F RVW MEDS BY RX/DR IN RCRD: CPT | Mod: CPTII,,, | Performed by: INTERNAL MEDICINE

## 2024-03-28 RX ORDER — CALCITRIOL 0.5 UG/1
0.5 CAPSULE ORAL 2 TIMES DAILY
Qty: 90 CAPSULE | Refills: 3 | Status: SHIPPED | OUTPATIENT
Start: 2024-03-28

## 2024-03-28 NOTE — PROGRESS NOTES
Internal Medicine    Patient ID: [id number]     Chief Complaint: Hypocalcaemia (4 week f/u)      HPI:     Carmen Oh is a 68 y.o. female here today for a follow up.   Follow up for hypocalcemia calcitriol at 0.5 mg b.i.d. currently calcium at 9.8.  Asymptomatic  Advised to not let anyone adjust her calcitriol dosing based on her vitamin-D level    Patient has hypocalcemia due to Hypoparathyroidism which is currently uncontrolled, and has been confirmed with ionized and/or corrected calcium.  We replaced her calcium she must remain on calcitriol 0.5 mcg twice a day.  Under no circumstances should this be decreased; the patient has been made aware of this and will let me know of any other providers plan to change her medication regimen    Past Medical History:   Diagnosis Date    Hypertension 2022    Hypocalcemia     Hypokalemia 2022    Hypothyroid 2022    Malignant neoplasm of breast 2022    Mixed hyperlipidemia 2022    Personal history of colonic polyps 2020    S/P parathyroidectomy         Past Surgical History:   Procedure Laterality Date    BREAST BIOPSY Right     BREAST LUMPECTOMY Right      SECTION      X 2    COLONOSCOPY  2020    ALFONZO Ornelas MD    THYROIDECTOMY      TONSILLECTOMY          Social History     Tobacco Use    Smoking status: Never    Smokeless tobacco: Never   Substance and Sexual Activity    Alcohol use: Not Currently     Comment: 1- 2 times/month    Drug use: Never    Sexual activity: Not Currently        Current Outpatient Medications   Medication Instructions    atorvastatin (LIPITOR) 40 mg, Oral, Nightly    calcitRIOL (ROCALTROL) 0.5 mcg, Oral, 2 times daily    CALCIUM CITRATE-VITAMIN D3 ORAL 1 capsule, Oral, 2 times daily    hydroCHLOROthiazide (HYDRODIURIL) 25 MG tablet Take 1 tablet by mouth once daily    levocetirizine (XYZAL) 5 MG tablet 1 tablet, Oral, Nightly    levothyroxine (SYNTHROID) 100 mcg, Oral, Before  "breakfast    pantoprazole (PROTONIX) 40 mg, Oral       Review of patient's allergies indicates:  No Known Allergies     Patient Care Team:  Tacos Falcon MD as PCP - General (Internal Medicine)  CASSIE Ornelas MD as Consulting Physician (Gastroenterology)  Antonio Willams MD as Consulting Physician (Oncology)     Subjective:     Review of Systems    12 point review of systems conducted, negative except as stated in the history of present illness. See HPI for details.    Objective:     Visit Vitals  /76 (BP Location: Left arm, Patient Position: Sitting, BP Method: Medium (Manual))   Pulse 67   Temp 97.5 °F (36.4 °C) (Temporal)   Resp 16   Ht 5' 6" (1.676 m)   Wt 74.4 kg (164 lb)   SpO2 98%   BMI 26.47 kg/m²       Physical Exam  Constitutional:       Appearance: Normal appearance.   Pulmonary:      Effort: Pulmonary effort is normal.   Neurological:      Mental Status: She is alert.   Psychiatric:         Mood and Affect: Mood normal.         Behavior: Behavior normal.         Labs Reviewed:     Chemistry:  Lab Results   Component Value Date     03/28/2024    K 4.8 03/28/2024    CHLORIDE 103 03/28/2024    BUN 16.6 03/28/2024    CREATININE 0.90 03/28/2024    EGFRNORACEVR >60 03/28/2024    GLUCOSE 113 03/28/2024    CALCIUM 9.8 03/28/2024    ALKPHOS 91 03/28/2024    LABPROT 7.0 03/28/2024    ALBUMIN 4.1 03/28/2024    BILIDIR 0.3 01/19/2022    IBILI 0.40 01/19/2022    AST 22 03/28/2024    ALT 21 03/28/2024    MG 1.50 (L) 02/27/2024    PHOS 5.1 (H) 06/16/2017    [id number] 92.2 (H) 11/13/2023    TSH 2.373 11/13/2023    [id number] 1.38 03/20/2023        Lab Results   Component Value Date    HGBA1C 6.0 04/19/2021        Hematology:  Lab Results   Component Value Date    WBC 7.00 08/18/2023    HGB 14.4 08/18/2023    HCT 45.5 08/18/2023     08/18/2023       Lipid Panel:  Lab Results   Component Value Date    CHOL 181 03/20/2023    HDL 60 03/20/2023    LDL 94.00 03/20/2023    TRIG 136 " 03/20/2023    TOTALCHOLEST 3 03/20/2023        Urine:  Lab Results   Component Value Date    COLORUA Yellow 03/20/2023    APPEARANCEUA Clear 03/20/2023    SGUA 1.019 03/20/2023    PHUA 7.0 03/20/2023    PROTEINUA Negative 03/20/2023    GLUCOSEUA Negative 03/20/2023    KETONESUA Negative 03/20/2023    BLOODUA Negative 03/20/2023    NITRITESUA Negative 03/20/2023    LEUKOCYTESUR Trace (A) 03/20/2023    RBCUA 0-5 03/20/2023    WBCUA <5 03/20/2023    BACTERIA None Seen 03/20/2023        Assessment:       ICD-10-CM ICD-9-CM   1. Hypocalcemia  E83.51 275.41   2. Vitamin D deficiency  E55.9 268.9        Plan:     1. Hypocalcemia  Assessment & Plan:  Patient has hypocalcemia due to Hypoparathyroidism which is currently uncontrolled, and has been confirmed with ionized and/or corrected calcium.  We replaced her calcium she must remain on calcitriol 0.5 mcg twice a day.  Under no circumstances should this be decreased; the patient has been made aware of this and will let me know of any other providers plan to change her medication regimen          2. Vitamin D deficiency  -     calcitRIOL (ROCALTROL) 0.5 MCG Cap; Take 1 capsule (0.5 mcg total) by mouth 2 (two) times daily.  Dispense: 90 capsule; Refill: 3         No follow-ups on file. In addition to their scheduled follow up, the patient has also been instructed to follow up on as needed basis.     Future Appointments   Date Time Provider Department Center   [facility]16/2024 11[facility]   8/20/2024 11:00 [facility]   8/21/2024  9:40 AM Tacos Falcon MD [facility] [address] [id number]        Tacos Falcon MD

## 2024-06-17 DIAGNOSIS — I10 HYPERTENSION, UNSPECIFIED TYPE: ICD-10-CM

## 2024-06-17 RX ORDER — HYDROCHLOROTHIAZIDE 25 MG/1
TABLET ORAL
Qty: 90 TABLET | Refills: 0 | Status: SHIPPED | OUTPATIENT
Start: 2024-06-17

## 2024-07-16 ENCOUNTER — PATIENT MESSAGE (OUTPATIENT)
Dept: ADMINISTRATIVE | Facility: HOSPITAL | Age: 69
End: 2024-07-16
Payer: MEDICARE

## 2024-07-17 ENCOUNTER — PATIENT OUTREACH (OUTPATIENT)
Facility: CLINIC | Age: 69
End: 2024-07-17
Payer: MEDICARE

## 2024-07-17 DIAGNOSIS — Z12.31 SCREENING MAMMOGRAM FOR BREAST CANCER: Primary | ICD-10-CM

## 2024-07-17 NOTE — PROGRESS NOTES
Health Maintenance Topic(s) Outreach Outcomes & Actions Taken:    Breast Cancer Screening - Outreach Outcomes & Actions Taken  : Mammogram Order Placed         Additional Notes:  Mammogram order

## 2024-07-22 ENCOUNTER — HOSPITAL ENCOUNTER (OUTPATIENT)
Dept: RADIOLOGY | Facility: HOSPITAL | Age: 69
Discharge: HOME | End: 2024-07-22
Attending: INTERNAL MEDICINE
Payer: MEDICARE

## 2024-07-22 DIAGNOSIS — Z12.31 SCREENING MAMMOGRAM FOR BREAST CANCER: ICD-10-CM

## 2024-07-22 PROCEDURE — 77067 SCR MAMMO BI INCL CAD: CPT | Mod: TC

## 2024-07-22 PROCEDURE — 77063 BREAST TOMOSYNTHESIS BI: CPT | Mod: TC

## 2024-07-22 PROCEDURE — 77063 BREAST TOMOSYNTHESIS BI: CPT | Mod: 26,,, | Performed by: STUDENT IN AN ORGANIZED HEALTH CARE EDUCATION/TRAINING PROGRAM

## 2024-07-22 PROCEDURE — 77067 SCR MAMMO BI INCL CAD: CPT | Mod: 26,,, | Performed by: STUDENT IN AN ORGANIZED HEALTH CARE EDUCATION/TRAINING PROGRAM

## 2024-08-19 DIAGNOSIS — E03.9 HYPOTHYROIDISM, UNSPECIFIED TYPE: ICD-10-CM

## 2024-08-19 DIAGNOSIS — I10 HYPERTENSION, UNSPECIFIED TYPE: ICD-10-CM

## 2024-08-19 DIAGNOSIS — K21.9 GASTROESOPHAGEAL REFLUX DISEASE, UNSPECIFIED WHETHER ESOPHAGITIS PRESENT: ICD-10-CM

## 2024-08-19 RX ORDER — ATORVASTATIN CALCIUM 40 MG/1
40 TABLET, FILM COATED ORAL NIGHTLY
Qty: 90 TABLET | Refills: 0 | Status: SHIPPED | OUTPATIENT
Start: 2024-08-19

## 2024-08-19 RX ORDER — LEVOTHYROXINE SODIUM 100 UG/1
100 TABLET ORAL
Qty: 90 TABLET | Refills: 0 | Status: SHIPPED | OUTPATIENT
Start: 2024-08-19 | End: 2024-08-21

## 2024-08-19 RX ORDER — PANTOPRAZOLE SODIUM 40 MG/1
40 TABLET, DELAYED RELEASE ORAL
Qty: 90 TABLET | Refills: 0 | Status: SHIPPED | OUTPATIENT
Start: 2024-08-19

## 2024-08-20 DIAGNOSIS — E03.9 HYPOTHYROIDISM, UNSPECIFIED TYPE: ICD-10-CM

## 2024-08-21 RX ORDER — LEVOTHYROXINE SODIUM 100 UG/1
100 TABLET ORAL
Qty: 90 TABLET | Refills: 0 | Status: SHIPPED | OUTPATIENT
Start: 2024-08-21

## 2024-09-05 ENCOUNTER — TELEPHONE (OUTPATIENT)
Dept: INTERNAL MEDICINE | Facility: CLINIC | Age: 69
End: 2024-09-05
Payer: MEDICARE

## 2024-09-05 DIAGNOSIS — E83.51 HYPOCALCEMIA: Primary | ICD-10-CM

## 2024-09-05 NOTE — TELEPHONE ENCOUNTER
----- Message from Tacos Falcon MD sent at 9/4/2024  4:21 PM CDT -----  Regarding: RE: SHANNAN 9/18/24 @ 3:20 Dr. Geronimo  Yes we have monitor her BMP for her calcium and her vitamin-D  ----- Message -----  From: Corby Gross MA  Sent: 9/4/2024  12:03 PM CDT  To: Tacos Falcon MD  Subject: SHANNAN 9/18/24 @ 3:20 Dr. Geronimo                    Please advise if pt needs labs for her 6 month f/u. Pt not due for wellness until after 11/15/24

## 2024-09-05 NOTE — TELEPHONE ENCOUNTER
1. Are there any outstanding tasks in the patient's chart? Yes, fasting labs    2. Is there any documentation in the chart? No    3.Has patient been seen in an ER, Urgent care clinic, or been admitted since last visit?  If yes, When, where, and why    4. Has patient seen any other healthcare providers since last visit?  If yes, when, where, and why    5. Has patient had any bloodwork or XR done since last visit?    6. Is patient signed up for patient portal?

## 2024-09-13 DIAGNOSIS — I10 HYPERTENSION, UNSPECIFIED TYPE: ICD-10-CM

## 2024-09-13 RX ORDER — HYDROCHLOROTHIAZIDE 25 MG/1
TABLET ORAL
Qty: 90 TABLET | Refills: 0 | Status: SHIPPED | OUTPATIENT
Start: 2024-09-13

## 2024-10-03 ENCOUNTER — LAB VISIT (OUTPATIENT)
Dept: LAB | Facility: HOSPITAL | Age: 69
End: 2024-10-03
Attending: INTERNAL MEDICINE
Payer: MEDICARE

## 2024-10-03 DIAGNOSIS — C50.211 MALIGNANT NEOPLASM OF UPPER-INNER QUADRANT OF RIGHT FEMALE BREAST, UNSPECIFIED ESTROGEN RECEPTOR STATUS: ICD-10-CM

## 2024-10-03 LAB
ALBUMIN SERPL-MCNC: 4.1 G/DL (ref 3.4–4.8)
ALBUMIN/GLOB SERPL: 1.4 RATIO (ref 1.1–2)
ALP SERPL-CCNC: 97 UNIT/L (ref 40–150)
ALT SERPL-CCNC: 21 UNIT/L (ref 0–55)
ANION GAP SERPL CALC-SCNC: 9 MEQ/L
AST SERPL-CCNC: 23 UNIT/L (ref 5–34)
BASOPHILS # BLD AUTO: 0.04 X10(3)/MCL
BASOPHILS NFR BLD AUTO: 0.6 %
BILIRUB SERPL-MCNC: 0.6 MG/DL
BUN SERPL-MCNC: 14.8 MG/DL (ref 9.8–20.1)
CALCIUM SERPL-MCNC: 9.1 MG/DL (ref 8.4–10.2)
CEA SERPL-MCNC: <1.73 NG/ML (ref 0–3)
CHLORIDE SERPL-SCNC: 103 MMOL/L (ref 98–107)
CO2 SERPL-SCNC: 29 MMOL/L (ref 23–31)
CREAT SERPL-MCNC: 0.94 MG/DL (ref 0.55–1.02)
CREAT/UREA NIT SERPL: 16
EOSINOPHIL # BLD AUTO: 0.1 X10(3)/MCL (ref 0–0.9)
EOSINOPHIL NFR BLD AUTO: 1.5 %
ERYTHROCYTE [DISTWIDTH] IN BLOOD BY AUTOMATED COUNT: 13.3 % (ref 11.5–17)
GFR SERPLBLD CREATININE-BSD FMLA CKD-EPI: >60 ML/MIN/1.73/M2
GLOBULIN SER-MCNC: 2.9 GM/DL (ref 2.4–3.5)
GLUCOSE SERPL-MCNC: 128 MG/DL (ref 82–115)
HCT VFR BLD AUTO: 45 % (ref 37–47)
HGB BLD-MCNC: 14.6 G/DL (ref 12–16)
IMM GRANULOCYTES # BLD AUTO: 0.01 X10(3)/MCL (ref 0–0.04)
IMM GRANULOCYTES NFR BLD AUTO: 0.2 %
LYMPHOCYTES # BLD AUTO: 2.4 X10(3)/MCL (ref 0.6–4.6)
LYMPHOCYTES NFR BLD AUTO: 36.1 %
MCH RBC QN AUTO: 28.9 PG (ref 27–31)
MCHC RBC AUTO-ENTMCNC: 32.4 G/DL (ref 33–36)
MCV RBC AUTO: 89.1 FL (ref 80–94)
MONOCYTES # BLD AUTO: 0.54 X10(3)/MCL (ref 0.1–1.3)
MONOCYTES NFR BLD AUTO: 8.1 %
NEUTROPHILS # BLD AUTO: 3.56 X10(3)/MCL (ref 2.1–9.2)
NEUTROPHILS NFR BLD AUTO: 53.5 %
PLATELET # BLD AUTO: 270 X10(3)/MCL (ref 130–400)
PMV BLD AUTO: 9.9 FL (ref 7.4–10.4)
POTASSIUM SERPL-SCNC: 4.1 MMOL/L (ref 3.5–5.1)
PROT SERPL-MCNC: 7 GM/DL (ref 5.8–7.6)
RBC # BLD AUTO: 5.05 X10(6)/MCL (ref 4.2–5.4)
SODIUM SERPL-SCNC: 141 MMOL/L (ref 136–145)
WBC # BLD AUTO: 6.65 X10(3)/MCL (ref 4.5–11.5)

## 2024-10-03 PROCEDURE — 86300 IMMUNOASSAY TUMOR CA 15-3: CPT

## 2024-10-03 PROCEDURE — 36415 COLL VENOUS BLD VENIPUNCTURE: CPT

## 2024-10-03 PROCEDURE — 80053 COMPREHEN METABOLIC PANEL: CPT

## 2024-10-03 PROCEDURE — 85025 COMPLETE CBC W/AUTO DIFF WBC: CPT

## 2024-10-03 PROCEDURE — 82378 CARCINOEMBRYONIC ANTIGEN: CPT

## 2024-10-07 LAB — CANCER AG27-29 SERPL-ACNC: <12 U/ML

## 2024-10-09 NOTE — PROGRESS NOTES
"Subjective:       Patient ID: Carmen Oh is a 69 y.o. female.    Chief Complaint: "I am doing fine"    Diagnosis: Stage IA right breast cancer 4/15 (T1a N0 M0), 0.4 cm, grade III, ER/[place] +, HER-2 +      Postmenopausal with intact uterus                  Heterozygous MUTYH mutation                  + COVID-19 vaccination    Current Treatment: Observation  Treatment History: MammoSite XRT completed 5/1/15; adjuvant Herceptin x 1 year completed 5/16; Femara x 5 yrs (completed 7/20)    Clinical History: Patient presented with an abnormal annual mammogram 3/23/15 showing a suspicious lesion at the 3 o'clock position of the right breast.  Stereotactic biopsy 3/31/15 was positive for invasive ductal carcinoma.  She underwent a lumpectomy and sentinel lymph node dissection on the right 4/15/15.  Final pathology showed a 0.4 cm grade 3 infiltrating ductal carcinoma with focal mucinous features.  There was also DCIS present.  Resection margins were clear.  6 sentinel lymph nodes were negative for involvement.  ER +94%, [place] +82% and HER-2 positive for overexpression by IHC and FISH.  Following her surgery, she underwent MammoSite radiation therapy to the right breast completing treatment 5/1/15.  She tolerated therapy well without any significant complications.      She was evaluated 5/4/15 for a Medical Oncology opinion.  Treatment options included: 1)TCH x 4 cycles with Herceptin continuing for one year, 2) single agent Herceptin for one year, or 3) no systemic adjuvant treatment.  Patient opted for adjuvant treatment with single agent Herceptin for 1 year followed by adjuvant hormonal therapy with an aromatase inhibitor.  She received her first cycle of Herceptin 5/18/15.  Baseline echocardiogram revealed normal left ventricular function with an ejection fraction of 55%.  There was no diastolic dysfunction.  Echocardiogram 12/28/15 revealed normal LV function with an EF of 55-60%.  There was impaired relaxation " compatible with diastolic dysfunction, trace mitral regurgitation and mild (1+) tricuspid regurgitation.  Repeat echocardiogram 3/24/16 showed a normal ejection fraction of 50% with mild mitral regurgitation and 2-3+ tricuspid regurgitation.  She was evaluated by cardiology who reviewed her echocardiogram and found her to be asymptomatic.  No additional treatment was indicated and follow-up echocardiogram was recommended in one year.  She completed 1 year of Herceptin therapy 5/6/16.    She underwent genetic testing by Dr. Trevino which revealed a heterozygous MUTYH mutation which is associated with an increased risk of colon cancer.  She had a colonoscopy in 2015 which revealed a single small polyp which was removed.  Follow-up exam was recommended in 5 years.  She had symptoms of vaginal atrophy and was started on low-dose vaginal estrogen therapy by her gynecologist with improvement in her symptoms. She had an EGD 10/4/18 which showed evidence of reflux esophagitis and mild erythema in the stomach. She was changed from Protonix to Ranitidine with improvement in her symptoms. She completed 5 years of adjuvant hormonal therapy with Femara 7/20.    She had a surveillance colonoscopy 11/24/20 showing a single 3-4 mm polyp in the ascending colon which was a tubular adenoma. Follow-up exam was recommended in 7 years. Bone density exam 1/19/22 showed normal bone density of both hips and femoral necks.       Interval History   Mrs. Oh is here today by herself for her annual breast cancer surveillance visit.  She denies any changes to her self breast exam.  Screening mammogram 7/22/24 showed no suspicious findings or changes.  Bone density exam 1/16/24 showed osteopenia of the left forearm.  She was evaluated in the ER 2/27/24 for critical hypocalcemia following a GI virus.  Otherwise, she has been well.  Internal medicine follows her calcium closely due to her history of parathyroid issues.  Laboratory for this  visit was unremarkable, including calcium, liver enzymes, and reviewed today with the patient.        Review of patient's allergies indicates:  No Known Allergies   Review of Systems   Constitutional: Negative.  Negative for appetite change and unexpected weight change.   HENT: Negative.  Negative for mouth sores.    Eyes: Negative.  Negative for visual disturbance.   Respiratory:  Negative for cough, chest tightness and shortness of breath.    Cardiovascular:  Negative for chest pain and leg swelling.   Gastrointestinal:  Negative for abdominal pain, blood in stool, change in bowel habit and diarrhea.   Endocrine: Negative.    Genitourinary: Negative.  Negative for frequency.   Musculoskeletal:  Negative for back pain.   Integumentary:  Negative for rash, breast mass and breast tenderness.   Allergic/Immunologic: Negative.    Neurological: Negative.  Negative for headaches.   Hematological:  Negative for adenopathy.   Psychiatric/Behavioral: Negative.  The patient is not nervous/anxious.    Breast: Negative for mass and tenderness        Past medical history:  Breast cancer, hyperthyroidism, hypocalcemia    Past surgical history:  Right lumpectomy, thyroidectomy,     Family history:  Brother had prostate cancer    Social history:  Social alcohol use, lifetime nonsmoker    Objective:     Vitals:    10/10/24 1303   BP: 128/86   Pulse: 67   Resp: 15   Temp: 98 °F (36.7 °C)             Physical Exam  Constitutional:       Appearance: Normal appearance.   HENT:      Head: Normocephalic.      Nose: Nose normal.      Mouth/Throat:      Mouth: Mucous membranes are moist.      Pharynx: No oropharyngeal exudate.   Eyes:      Conjunctiva/sclera: Conjunctivae normal.      Pupils: Pupils are equal, round, and reactive to light.   Cardiovascular:      Rate and Rhythm: Normal rate and regular rhythm.   Pulmonary:      Effort: Pulmonary effort is normal.      Breath sounds: Normal breath sounds.   Chest:      Comments:  Well-healed right periareolar and right axillary incisions.  Stable masslike thickening at 1:00 right breast at the site of previous MammoSite  Abdominal:      General: Bowel sounds are normal.      Palpations: Abdomen is soft.   Musculoskeletal:         General: Normal range of motion.      Cervical back: Normal range of motion.   Lymphadenopathy:      Cervical: No cervical adenopathy.      Upper Body:      Right upper body: No supraclavicular or axillary adenopathy.      Left upper body: No supraclavicular or axillary adenopathy.   Skin:     General: Skin is warm.   Neurological:      General: No focal deficit present.      Mental Status: She is alert and oriented to person, place, and time.   Psychiatric:         Mood and Affect: Mood normal.         Behavior: Behavior normal.       ECOG SCORE    0 - Fully active-able to carry on all pre-disease performance without restriction            Laboratory     Latest Reference Range & Units 10/03/24 06:59   WBC 4.50 - 11.50 x10(3)/mcL 6.65   RBC 4.20 - 5.40 x10(6)/mcL 5.05   Hemoglobin 12.0 - 16.0 g/dL 14.6   Hematocrit 37.0 - 47.0 % 45.0   MCV 80.0 - 94.0 fL 89.1   MCH 27.0 - 31.0 pg 28.9   MCHC 33.0 - 36.0 g/dL 32.4 (L)   RDW 11.5 - 17.0 % 13.3   Platelet Count 130 - 400 x10(3)/mcL 270   MPV 7.4 - 10.4 fL 9.9   Neut % % 53.5   LYMPH % % 36.1   Mono % % 8.1   Eos % % 1.5   Basophil % % 0.6   Immature Granulocytes % 0.2   Neut # 2.1 - 9.2 x10(3)/mcL 3.56   Lymph # 0.6 - 4.6 x10(3)/mcL 2.40   Mono # 0.1 - 1.3 x10(3)/mcL 0.54   Eos # 0 - 0.9 x10(3)/mcL 0.10   Baso # <=0.2 x10(3)/mcL 0.04   Immature Grans (Abs) 0 - 0.04 x10(3)/mcL 0.01   Sodium 136 - 145 mmol/L 141   Potassium 3.5 - 5.1 mmol/L 4.1   Chloride 98 - 107 mmol/L 103   CO2 23 - 31 mmol/L 29   Anion Gap mEq/L 9.0   BUN 9.8 - 20.1 mg/dL 14.8   Creatinine 0.55 - 1.02 mg/dL 0.94   BUN/CREAT RATIO  16   eGFR mL/min/1.73/m2 >60   Glucose 82 - 115 mg/dL 128 (H)   Calcium 8.4 - 10.2 mg/dL 9.1   ALP 40 - 150 unit/L 97    PROTEIN TOTAL 5.8 - 7.6 gm/dL 7.0   Albumin 3.4 - 4.8 g/dL 4.1   Albumin/Globulin Ratio 1.1 - 2.0 ratio 1.4   BILIRUBIN TOTAL <=1.5 mg/dL 0.6   AST 5 - 34 unit/L 23   ALT 0 - 55 unit/L 21   Globulin, Total 2.4 - 3.5 gm/dL 2.9   CEA 0.00 - 3.00 ng/mL <1.73   Breast Carcinoma Assoc Ag(CA 27.29) <=38.0 U/mL <12.0   (L): Data is abnormally low  (H): Data is abnormally high    Assessment:   Stage I A breast cancer        Plan:   Patient has no suspicious findings on physical exam or laboratory.  Annual mammogram is due 7/2025.  RTC after above for continued breast cancer surveillance and survivorship follow-up.  CBC, CMP, CEA and CA 27-29 prior to visit.  Colonoscopy is up to date.  Patient in agreement.  All questions answered to her satisfaction.    JUANI CARSON, FNP-C    Other Physicians  Dr. Trinidad Herman

## 2024-10-10 ENCOUNTER — OFFICE VISIT (OUTPATIENT)
Dept: HEMATOLOGY/ONCOLOGY | Facility: CLINIC | Age: 69
End: 2024-10-10
Payer: MEDICARE

## 2024-10-10 VITALS
OXYGEN SATURATION: 98 % | WEIGHT: 166 LBS | HEART RATE: 67 BPM | DIASTOLIC BLOOD PRESSURE: 86 MMHG | RESPIRATION RATE: 15 BRPM | TEMPERATURE: 98 F | BODY MASS INDEX: 26.68 KG/M2 | SYSTOLIC BLOOD PRESSURE: 128 MMHG | HEIGHT: 66 IN

## 2024-10-10 DIAGNOSIS — Z85.3 PERSONAL HISTORY OF BREAST CANCER: Primary | ICD-10-CM

## 2024-10-10 DIAGNOSIS — Z12.31 SCREENING MAMMOGRAM FOR BREAST CANCER: ICD-10-CM

## 2024-10-10 PROBLEM — C50.919 MALIGNANT NEOPLASM OF BREAST: Status: RESOLVED | Noted: 2022-09-14 | Resolved: 2024-10-10

## 2024-10-10 PROCEDURE — 99999 PR PBB SHADOW E&M-EST. PATIENT-LVL IV: CPT | Mod: PBBFAC,,, | Performed by: NURSE PRACTITIONER

## 2024-10-14 ENCOUNTER — TELEPHONE (OUTPATIENT)
Dept: INTERNAL MEDICINE | Facility: CLINIC | Age: 69
End: 2024-10-14
Payer: MEDICARE

## 2024-10-14 NOTE — TELEPHONE ENCOUNTER
----- Message from Med Assistant Tavarez sent at 10/14/2024 12:46 PM CDT -----  Regarding: PV 10/29/24 @ 11:00 Dr. Geronimo  Pt is scheduled for Wellness. Last wellness was 11/15/23. Please r/s pt appt.   Thank you!

## 2024-10-16 ENCOUNTER — PATIENT MESSAGE (OUTPATIENT)
Dept: HEMATOLOGY/ONCOLOGY | Facility: CLINIC | Age: 69
End: 2024-10-16
Payer: MEDICARE

## 2024-10-23 ENCOUNTER — LAB VISIT (OUTPATIENT)
Dept: LAB | Facility: HOSPITAL | Age: 69
End: 2024-10-23
Attending: INTERNAL MEDICINE
Payer: MEDICARE

## 2024-10-23 DIAGNOSIS — E83.51 HYPOCALCEMIA: ICD-10-CM

## 2024-10-23 LAB
25(OH)D3+25(OH)D2 SERPL-MCNC: 46 NG/ML (ref 30–80)
ANION GAP SERPL CALC-SCNC: 8 MEQ/L
BUN SERPL-MCNC: 20.1 MG/DL (ref 9.8–20.1)
CALCIUM SERPL-MCNC: 9.1 MG/DL (ref 8.4–10.2)
CHLORIDE SERPL-SCNC: 108 MMOL/L (ref 98–107)
CO2 SERPL-SCNC: 29 MMOL/L (ref 23–31)
CREAT SERPL-MCNC: 0.88 MG/DL (ref 0.55–1.02)
CREAT/UREA NIT SERPL: 23
GFR SERPLBLD CREATININE-BSD FMLA CKD-EPI: >60 ML/MIN/1.73/M2
GLUCOSE SERPL-MCNC: 128 MG/DL (ref 82–115)
POTASSIUM SERPL-SCNC: 5.1 MMOL/L (ref 3.5–5.1)
SODIUM SERPL-SCNC: 145 MMOL/L (ref 136–145)

## 2024-10-23 PROCEDURE — 36415 COLL VENOUS BLD VENIPUNCTURE: CPT

## 2024-10-23 PROCEDURE — 80048 BASIC METABOLIC PNL TOTAL CA: CPT

## 2024-10-23 PROCEDURE — 82306 VITAMIN D 25 HYDROXY: CPT

## 2024-10-24 ENCOUNTER — OFFICE VISIT (OUTPATIENT)
Dept: INTERNAL MEDICINE | Facility: CLINIC | Age: 69
End: 2024-10-24
Payer: MEDICARE

## 2024-10-24 VITALS
SYSTOLIC BLOOD PRESSURE: 124 MMHG | DIASTOLIC BLOOD PRESSURE: 82 MMHG | TEMPERATURE: 97 F | HEIGHT: 66 IN | WEIGHT: 163 LBS | HEART RATE: 72 BPM | OXYGEN SATURATION: 96 % | RESPIRATION RATE: 16 BRPM | BODY MASS INDEX: 26.2 KG/M2

## 2024-10-24 DIAGNOSIS — L98.9 SKIN LESION: ICD-10-CM

## 2024-10-24 DIAGNOSIS — E89.2 POSTPROCEDURAL HYPOPARATHYROIDISM: ICD-10-CM

## 2024-10-24 DIAGNOSIS — E83.51 HYPOCALCEMIA: Primary | ICD-10-CM

## 2024-10-24 DIAGNOSIS — Z23 NEED FOR VACCINATION: ICD-10-CM

## 2024-10-24 NOTE — PROGRESS NOTES
Internal Medicine    Patient ID: [id number]     Chief Complaint: Rash (On Nose)      HPI:     Carmen Oh is a 69 y.o. female here today for a follow up.   Red skin lesion to nose seems like it came out of nowhere had some bleeding still present it is flat very minimal erythema no further episodes of bleeding.  Lots of sun exposure.  No dermatologist.  Labs reviewed calcium level is stable at 9.1 vitamin-D at 46  Her blood sugar fasting is 128 which is not her usual patient states she has been eating too much pumpkin spice  Past Medical History:   Diagnosis Date    Hypertension 2022    Hypocalcemia     Hypokalemia 2022    Hypothyroid 2022    Malignant neoplasm of breast 2022    Mixed hyperlipidemia 2022    Personal history of colonic polyps 2020    S/P parathyroidectomy         Past Surgical History:   Procedure Laterality Date    BREAST BIOPSY Right     BREAST LUMPECTOMY Right      SECTION      X 2    COLONOSCOPY  2020    ALFONZO Ornelas MD    THYROIDECTOMY      TONSILLECTOMY          Social History     Tobacco Use    Smoking status: Never    Smokeless tobacco: Never   Substance and Sexual Activity    Alcohol use: Not Currently     Comment: 1- 2 times/month    Drug use: Never    Sexual activity: Not Currently        Current Outpatient Medications   Medication Instructions    atorvastatin (LIPITOR) 40 mg, Oral, Nightly    calcitRIOL (ROCALTROL) 0.5 mcg, Oral, 2 times daily    CALCIUM CITRATE-VITAMIN D3 ORAL 1 capsule, 2 times daily    hydroCHLOROthiazide (HYDRODIURIL) 25 MG tablet Take 1 tablet by mouth once daily    levocetirizine (XYZAL) 5 MG tablet 1 tablet, Nightly    levothyroxine (SYNTHROID) 100 mcg, Oral, Before breakfast    pantoprazole (PROTONIX) 40 mg, Oral       Review of patient's allergies indicates:  No Known Allergies     Patient Care Team:  Tacos Falcon MD as PCP - General (Internal Medicine)  CASSIE Ornelas MD as  "Consulting Physician (Gastroenterology)  Antonio Willams MD as Consulting Physician (Oncology)     Subjective:     Review of Systems   Constitutional:  Negative for activity change and unexpected weight change.   HENT:  Negative for hearing loss, rhinorrhea and trouble swallowing.    Eyes:  Negative for discharge and visual disturbance.   Respiratory:  Negative for chest tightness and wheezing.    Cardiovascular:  Negative for chest pain and palpitations.   Gastrointestinal:  Negative for blood in stool, constipation, diarrhea and vomiting.   Endocrine: Negative for polydipsia and polyuria.   Genitourinary:  Negative for difficulty urinating, dysuria, hematuria and menstrual problem.   Musculoskeletal:  Negative for arthralgias, joint swelling and neck pain.   Neurological:  Negative for weakness and headaches.   Psychiatric/Behavioral:  Negative for confusion and dysphoric mood.        12 point review of systems conducted, negative except as stated in the history of present illness. See HPI for details.    Objective:     Visit Vitals  /82 (BP Location: Left arm, Patient Position: Sitting)   Pulse 72   Temp 97.4 °F (36.3 °C) (Temporal)   Resp 16   Ht 5' 6" (1.676 m)   Wt 73.9 kg (163 lb)   SpO2 96%   BMI 26.31 kg/m²       Physical Exam  Constitutional:       Appearance: Normal appearance.   HENT:      Head: Normocephalic and atraumatic.   Eyes:      Extraocular Movements: Extraocular movements intact.      Pupils: Pupils are equal, round, and reactive to light.   Cardiovascular:      Rate and Rhythm: Normal rate and regular rhythm.   Pulmonary:      Effort: Pulmonary effort is normal.      Breath sounds: Normal breath sounds.   Skin:     General: Skin is warm and dry.   Neurological:      General: No focal deficit present.      Mental Status: She is alert.   Psychiatric:         Mood and Affect: Mood normal.         Labs Reviewed:     Chemistry:  Lab Results   Component Value Date     10/23/2024    K " 5.1 10/23/2024    BUN 20.1 10/23/2024    CREATININE 0.88 10/23/2024    EGFRNORACEVR >60 10/23/2024    GLUCOSE 128 (H) 10/23/2024    CALCIUM 9.1 10/23/2024    ALKPHOS 97 10/03/2024    LABPROT 7.0 10/03/2024    ALBUMIN 4.1 10/03/2024    BILIDIR 0.3 01/19/2022    IBILI 0.40 01/19/2022    AST 23 10/03/2024    ALT 21 10/03/2024    MG 1.50 (L) 02/27/2024    PHOS 5.1 (H) 06/16/2017    MBKEZUSL58KG 46 10/23/2024    TSH 2.373 11/13/2023    KBXMEN4TCUS 1.38 03/20/2023        Lab Results   Component Value Date    HGBA1C 6.0 04/19/2021        Hematology:  Lab Results   Component Value Date    WBC 6.65 10/03/2024    HGB 14.6 10/03/2024    HCT 45.0 10/03/2024     10/03/2024       Lipid Panel:  Lab Results   Component Value Date    CHOL 181 03/20/2023    HDL 60 03/20/2023    LDL 94.00 03/20/2023    TRIG 136 03/20/2023    TOTALCHOLEST 3 03/20/2023        Urine:  Lab Results   Component Value Date    APPEARANCEUA Clear 03/20/2023    SGUA 1.019 03/20/2023    PROTEINUA Negative 03/20/2023    KETONESUA Negative 03/20/2023    LEUKOCYTESUR Trace (A) 03/20/2023    RBCUA 0-5 03/20/2023    WBCUA <5 03/20/2023    BACTERIA None Seen 03/20/2023        Assessment:       ICD-10-CM ICD-9-CM   1. Hypocalcemia  E83.51 275.41   2. Postprocedural hypoparathyroidism  E89.2 252.1   3. Skin lesion  L98.9 709.9        Plan:     1. Hypocalcemia  Assessment & Plan:  Hypocalcemia is likely due to Hypoparathyroidism. The most recent calcium and albumin results listed below.  Recent Labs     10/23/24  0603   CALCIUM 9.1      monitor electrolytes closely.  The patient's hypocalcemia is stable  - she must remain on calcitriol 0.5 mcg twice a day. Under no circumstances should this be decreased; the patient has been made aware of this and will let me know of any other providers plan to change her medication regimen           2. Postprocedural hypoparathyroidism    3. Skin lesion  -     Ambulatory referral/consult to Dermatology; Future; Expected date:  10/31/2024         Follow up in about 3 months (around 1/24/2025) for WELLNESS, with labs prior to visit, NURSE PRACTITIONER. In addition to their scheduled follow up, the patient has also been instructed to follow up on as needed basis.     Future Appointments   Date Time Provider Department Center   10/10/2025  8:00 AM [facility]   10/14/2025 10:00 AM [facility]        Tacos Falcon MD  An office visit for an established patient was performed. 10 minutes was used for reviewing the patients chart prior to the inoffice visit done on that same day. 15 minutes was used during the visit in regards to taking the patient history and physical exam. There was also an additional 5 minutes spent on education and counseling regarding medical conditions, current medications including risk/benefit and side effects/adverse events, vaccine counseling. After leaving the exam room, the provider then spent an additional 5 minutes completing the electronic health record.    The patient is receptive, expresses understanding and is agreeable to plan. All questions answered; total time spent was 35 minutes.

## 2024-10-24 NOTE — ASSESSMENT & PLAN NOTE
Hypocalcemia is likely due to Hypoparathyroidism. The most recent calcium and albumin results listed below.  Recent Labs     10/23/24  0603   CALCIUM 9.1      monitor electrolytes closely.  The patient's hypocalcemia is stable  - she must remain on calcitriol 0.5 mcg twice a day. Under no circumstances should this be decreased; the patient has been made aware of this and will let me know of any other providers plan to change her medication regimen

## 2024-11-18 DIAGNOSIS — I10 HYPERTENSION, UNSPECIFIED TYPE: ICD-10-CM

## 2024-11-18 DIAGNOSIS — K21.9 GASTROESOPHAGEAL REFLUX DISEASE, UNSPECIFIED WHETHER ESOPHAGITIS PRESENT: ICD-10-CM

## 2024-11-18 RX ORDER — ATORVASTATIN CALCIUM 40 MG/1
40 TABLET, FILM COATED ORAL NIGHTLY
Qty: 90 TABLET | Refills: 0 | Status: SHIPPED | OUTPATIENT
Start: 2024-11-18

## 2024-11-18 RX ORDER — PANTOPRAZOLE SODIUM 40 MG/1
40 TABLET, DELAYED RELEASE ORAL
Qty: 90 TABLET | Refills: 0 | Status: SHIPPED | OUTPATIENT
Start: 2024-11-18

## 2024-12-19 DIAGNOSIS — I10 HYPERTENSION, UNSPECIFIED TYPE: ICD-10-CM

## 2024-12-19 RX ORDER — HYDROCHLOROTHIAZIDE 25 MG/1
TABLET ORAL
Qty: 90 TABLET | Refills: 0 | Status: SHIPPED | OUTPATIENT
Start: 2024-12-19

## 2025-01-15 ENCOUNTER — TELEPHONE (OUTPATIENT)
Dept: INTERNAL MEDICINE | Facility: CLINIC | Age: 70
End: 2025-01-15
Payer: MEDICARE

## 2025-01-15 DIAGNOSIS — I10 HYPERTENSION, UNSPECIFIED TYPE: Primary | ICD-10-CM

## 2025-01-15 DIAGNOSIS — E55.9 VITAMIN D DEFICIENCY: ICD-10-CM

## 2025-01-15 DIAGNOSIS — E78.2 MIXED HYPERLIPIDEMIA: ICD-10-CM

## 2025-01-15 DIAGNOSIS — E03.9 HYPOTHYROIDISM, UNSPECIFIED TYPE: ICD-10-CM

## 2025-01-15 DIAGNOSIS — I10 PRIMARY HYPERTENSION: ICD-10-CM

## 2025-01-15 DIAGNOSIS — E83.51 HYPOCALCEMIA: ICD-10-CM

## 2025-01-15 DIAGNOSIS — E89.2 POSTPROCEDURAL HYPOPARATHYROIDISM: ICD-10-CM

## 2025-01-15 DIAGNOSIS — E87.6 HYPOKALEMIA: ICD-10-CM

## 2025-01-15 NOTE — TELEPHONE ENCOUNTER
----- Message from Yehuda Chu sent at 1/15/2025  3:12 PM CST -----  Regarding: Stanley 1/30/25  1. Are there any outstanding tasks in the patient's chart? Yes, fasting labs    2. Is there any documentation in the chart? No    3.Has patient been seen in an ER, Urgent care clinic, or been admitted since last visit?  If yes, When, where, and why    4. Has patient seen any other healthcare providers since last visit?  If yes, when, where, and why    5. Has patient had any bloodwork or XR done since last visit?    6. Is patient signed up for patient portal?

## 2025-01-18 DIAGNOSIS — E55.9 VITAMIN D DEFICIENCY: ICD-10-CM

## 2025-01-20 RX ORDER — CALCITRIOL 0.5 UG/1
0.5 CAPSULE ORAL 2 TIMES DAILY
Qty: 90 CAPSULE | Refills: 0 | Status: SHIPPED | OUTPATIENT
Start: 2025-01-20

## 2025-02-12 DIAGNOSIS — K21.9 GASTROESOPHAGEAL REFLUX DISEASE, UNSPECIFIED WHETHER ESOPHAGITIS PRESENT: ICD-10-CM

## 2025-02-12 DIAGNOSIS — I10 HYPERTENSION, UNSPECIFIED TYPE: ICD-10-CM

## 2025-02-12 DIAGNOSIS — E03.9 HYPOTHYROIDISM, UNSPECIFIED TYPE: ICD-10-CM

## 2025-02-12 RX ORDER — LEVOTHYROXINE SODIUM 100 UG/1
100 TABLET ORAL
Qty: 90 TABLET | Refills: 0 | Status: SHIPPED | OUTPATIENT
Start: 2025-02-12

## 2025-02-12 RX ORDER — PANTOPRAZOLE SODIUM 40 MG/1
40 TABLET, DELAYED RELEASE ORAL
Qty: 90 TABLET | Refills: 0 | Status: SHIPPED | OUTPATIENT
Start: 2025-02-12

## 2025-02-12 RX ORDER — ATORVASTATIN CALCIUM 40 MG/1
40 TABLET, FILM COATED ORAL NIGHTLY
Qty: 90 TABLET | Refills: 0 | Status: SHIPPED | OUTPATIENT
Start: 2025-02-12

## 2025-02-28 DIAGNOSIS — E55.9 VITAMIN D DEFICIENCY: ICD-10-CM

## 2025-02-28 RX ORDER — CALCITRIOL 0.5 UG/1
0.5 CAPSULE ORAL 2 TIMES DAILY
Qty: 90 CAPSULE | Refills: 0 | Status: SHIPPED | OUTPATIENT
Start: 2025-02-28

## 2025-03-22 DIAGNOSIS — I10 HYPERTENSION, UNSPECIFIED TYPE: ICD-10-CM

## 2025-03-24 RX ORDER — HYDROCHLOROTHIAZIDE 25 MG/1
25 TABLET ORAL
Qty: 90 TABLET | Refills: 0 | Status: SHIPPED | OUTPATIENT
Start: 2025-03-24

## 2025-04-15 DIAGNOSIS — E55.9 VITAMIN D DEFICIENCY: ICD-10-CM

## 2025-04-15 RX ORDER — CALCITRIOL 0.5 UG/1
0.5 CAPSULE ORAL 2 TIMES DAILY
Qty: 90 CAPSULE | Refills: 0 | Status: SHIPPED | OUTPATIENT
Start: 2025-04-15

## 2025-05-13 DIAGNOSIS — I10 HYPERTENSION, UNSPECIFIED TYPE: ICD-10-CM

## 2025-05-13 DIAGNOSIS — E03.9 HYPOTHYROIDISM, UNSPECIFIED TYPE: ICD-10-CM

## 2025-05-13 DIAGNOSIS — K21.9 GASTROESOPHAGEAL REFLUX DISEASE, UNSPECIFIED WHETHER ESOPHAGITIS PRESENT: ICD-10-CM

## 2025-05-13 RX ORDER — ATORVASTATIN CALCIUM 40 MG/1
40 TABLET, FILM COATED ORAL NIGHTLY
Qty: 90 TABLET | Refills: 0 | Status: SHIPPED | OUTPATIENT
Start: 2025-05-13

## 2025-05-13 RX ORDER — LEVOTHYROXINE SODIUM 100 UG/1
100 TABLET ORAL
Qty: 90 TABLET | Refills: 0 | Status: SHIPPED | OUTPATIENT
Start: 2025-05-13

## 2025-05-13 RX ORDER — PANTOPRAZOLE SODIUM 40 MG/1
40 TABLET, DELAYED RELEASE ORAL
Qty: 90 TABLET | Refills: 0 | Status: SHIPPED | OUTPATIENT
Start: 2025-05-13

## 2025-05-14 DIAGNOSIS — E03.9 HYPOTHYROIDISM, UNSPECIFIED TYPE: ICD-10-CM

## 2025-05-15 RX ORDER — LEVOTHYROXINE SODIUM 100 UG/1
100 TABLET ORAL
Qty: 90 TABLET | Refills: 0 | OUTPATIENT
Start: 2025-05-15

## 2025-05-29 DIAGNOSIS — E55.9 VITAMIN D DEFICIENCY: ICD-10-CM

## 2025-05-29 RX ORDER — CALCITRIOL 0.5 UG/1
0.5 CAPSULE ORAL 2 TIMES DAILY
Qty: 90 CAPSULE | Refills: 0 | Status: SHIPPED | OUTPATIENT
Start: 2025-05-29

## 2025-06-10 ENCOUNTER — TELEPHONE (OUTPATIENT)
Dept: INTERNAL MEDICINE | Facility: CLINIC | Age: 70
End: 2025-06-10
Payer: MEDICARE

## 2025-06-10 DIAGNOSIS — E89.2 POSTPROCEDURAL HYPOPARATHYROIDISM: ICD-10-CM

## 2025-06-10 DIAGNOSIS — E87.6 HYPOKALEMIA: ICD-10-CM

## 2025-06-10 DIAGNOSIS — E83.51 HYPOCALCEMIA: ICD-10-CM

## 2025-06-10 DIAGNOSIS — I10 PRIMARY HYPERTENSION: Primary | ICD-10-CM

## 2025-06-10 DIAGNOSIS — E78.2 MIXED HYPERLIPIDEMIA: ICD-10-CM

## 2025-06-10 DIAGNOSIS — E55.9 VITAMIN D DEFICIENCY: ICD-10-CM

## 2025-06-10 NOTE — TELEPHONE ENCOUNTER
Copied from CRM #[id number]. Topic: Appointments - Appointment Confirmation  >> Doug 10, 2025  8:13 AM Monica wrote:  Who Called: Carmen Oh    Caller is requesting assistance/information from provider's office.    Symptoms (please be specific):    How long has patient had these symptoms:    List of preferred pharmacies on file (remove unneeded): [unfilled]  If different, enter pharmacy into here including location and phone number:       Preferred Method of Contact: Phone Call  Patient's Preferred Phone Number on File: [phone]   Best Call Back Number, if different:  Additional Information: pt states she needs full wellness labs for upcoming appt    Labs ordered. Pt informed.

## 2025-06-11 ENCOUNTER — LAB VISIT (OUTPATIENT)
Dept: LAB | Facility: HOSPITAL | Age: 70
End: 2025-06-11
Attending: INTERNAL MEDICINE
Payer: MEDICARE

## 2025-06-11 ENCOUNTER — OFFICE VISIT (OUTPATIENT)
Dept: INTERNAL MEDICINE | Facility: CLINIC | Age: 70
End: 2025-06-11
Payer: MEDICARE

## 2025-06-11 VITALS
DIASTOLIC BLOOD PRESSURE: 76 MMHG | OXYGEN SATURATION: 96 % | SYSTOLIC BLOOD PRESSURE: 124 MMHG | HEART RATE: 66 BPM | TEMPERATURE: 98 F | HEIGHT: 66 IN | RESPIRATION RATE: 16 BRPM | BODY MASS INDEX: 26.52 KG/M2 | WEIGHT: 165 LBS

## 2025-06-11 DIAGNOSIS — E89.2 POSTPROCEDURAL HYPOPARATHYROIDISM: ICD-10-CM

## 2025-06-11 DIAGNOSIS — Z12.31 SCREENING MAMMOGRAM FOR BREAST CANCER: ICD-10-CM

## 2025-06-11 DIAGNOSIS — I10 PRIMARY HYPERTENSION: ICD-10-CM

## 2025-06-11 DIAGNOSIS — Z98.890 S/P PARATHYROIDECTOMY: ICD-10-CM

## 2025-06-11 DIAGNOSIS — Z00.00 WELL ADULT EXAM: Primary | ICD-10-CM

## 2025-06-11 DIAGNOSIS — E78.2 MIXED HYPERLIPIDEMIA: ICD-10-CM

## 2025-06-11 DIAGNOSIS — E83.51 HYPOCALCEMIA: ICD-10-CM

## 2025-06-11 DIAGNOSIS — E87.6 HYPOKALEMIA: ICD-10-CM

## 2025-06-11 DIAGNOSIS — E89.0 POSTOPERATIVE HYPOTHYROIDISM: ICD-10-CM

## 2025-06-11 DIAGNOSIS — N95.2 VAGINAL ATROPHY: ICD-10-CM

## 2025-06-11 DIAGNOSIS — E55.9 VITAMIN D DEFICIENCY: ICD-10-CM

## 2025-06-11 DIAGNOSIS — Z90.89 S/P PARATHYROIDECTOMY: ICD-10-CM

## 2025-06-11 PROBLEM — L98.9 SKIN LESION: Status: RESOLVED | Noted: 2024-10-24 | Resolved: 2025-06-11

## 2025-06-11 LAB
25(OH)D3+25(OH)D2 SERPL-MCNC: 74 NG/ML (ref 30–80)
ALBUMIN SERPL-MCNC: 4 G/DL (ref 3.4–4.8)
ALBUMIN/GLOB SERPL: 1.3 RATIO (ref 1.1–2)
ALP SERPL-CCNC: 88 UNIT/L (ref 40–150)
ALT SERPL-CCNC: 15 UNIT/L (ref 0–55)
ANION GAP SERPL CALC-SCNC: 10 MEQ/L
AST SERPL-CCNC: 21 UNIT/L (ref 11–45)
BACTERIA #/AREA URNS AUTO: ABNORMAL /HPF
BASOPHILS # BLD AUTO: 0.06 X10(3)/MCL
BASOPHILS NFR BLD AUTO: 0.9 %
BILIRUB SERPL-MCNC: 0.9 MG/DL
BILIRUB UR QL STRIP.AUTO: NEGATIVE
BUN SERPL-MCNC: 17.7 MG/DL (ref 9.8–20.1)
CALCIUM SERPL-MCNC: 8.8 MG/DL (ref 8.4–10.2)
CHLORIDE SERPL-SCNC: 103 MMOL/L (ref 98–107)
CHOLEST SERPL-MCNC: 175 MG/DL
CHOLEST/HDLC SERPL: 3 {RATIO} (ref 0–5)
CLARITY UR: CLEAR
CO2 SERPL-SCNC: 28 MMOL/L (ref 23–31)
COLOR UR AUTO: ABNORMAL
CREAT SERPL-MCNC: 0.98 MG/DL (ref 0.55–1.02)
CREAT/UREA NIT SERPL: 18
EOSINOPHIL # BLD AUTO: 0.1 X10(3)/MCL (ref 0–0.9)
EOSINOPHIL NFR BLD AUTO: 1.6 %
ERYTHROCYTE [DISTWIDTH] IN BLOOD BY AUTOMATED COUNT: 14 % (ref 11.5–17)
GFR SERPLBLD CREATININE-BSD FMLA CKD-EPI: >60 ML/MIN/1.73/M2
GLOBULIN SER-MCNC: 3.1 GM/DL (ref 2.4–3.5)
GLUCOSE SERPL-MCNC: 114 MG/DL (ref 82–115)
GLUCOSE UR QL STRIP: NORMAL
HCT VFR BLD AUTO: 45.1 % (ref 37–47)
HDLC SERPL-MCNC: 58 MG/DL (ref 35–60)
HGB BLD-MCNC: 14.6 G/DL (ref 12–16)
HGB UR QL STRIP: ABNORMAL
HYALINE CASTS #/AREA URNS LPF: ABNORMAL /LPF
IMM GRANULOCYTES # BLD AUTO: 0.02 X10(3)/MCL (ref 0–0.04)
IMM GRANULOCYTES NFR BLD AUTO: 0.3 %
KETONES UR QL STRIP: NEGATIVE
LDLC SERPL CALC-MCNC: 98 MG/DL (ref 50–140)
LEUKOCYTE ESTERASE UR QL STRIP: 75
LYMPHOCYTES # BLD AUTO: 2.42 X10(3)/MCL (ref 0.6–4.6)
LYMPHOCYTES NFR BLD AUTO: 38.2 %
MCH RBC QN AUTO: 28.9 PG (ref 27–31)
MCHC RBC AUTO-ENTMCNC: 32.4 G/DL (ref 33–36)
MCV RBC AUTO: 89.3 FL (ref 80–94)
MONOCYTES # BLD AUTO: 0.5 X10(3)/MCL (ref 0.1–1.3)
MONOCYTES NFR BLD AUTO: 7.9 %
MUCOUS THREADS URNS QL MICRO: ABNORMAL /LPF
NEUTROPHILS # BLD AUTO: 3.23 X10(3)/MCL (ref 2.1–9.2)
NEUTROPHILS NFR BLD AUTO: 51.1 %
NITRITE UR QL STRIP: NEGATIVE
NRBC BLD AUTO-RTO: 0 %
PH UR STRIP: 6 [PH]
PLATELET # BLD AUTO: 289 X10(3)/MCL (ref 130–400)
PMV BLD AUTO: 10.6 FL (ref 7.4–10.4)
POTASSIUM SERPL-SCNC: 4 MMOL/L (ref 3.5–5.1)
PROT SERPL-MCNC: 7.1 GM/DL (ref 5.8–7.6)
PROT UR QL STRIP: NEGATIVE
PTH-INTACT SERPL-MCNC: <4 PG/ML (ref 8.7–77)
RBC # BLD AUTO: 5.05 X10(6)/MCL (ref 4.2–5.4)
RBC #/AREA URNS AUTO: ABNORMAL /HPF
SODIUM SERPL-SCNC: 141 MMOL/L (ref 136–145)
SP GR UR STRIP.AUTO: 1.02 (ref 1–1.03)
SQUAMOUS #/AREA URNS LPF: ABNORMAL /HPF
TRIGL SERPL-MCNC: 93 MG/DL (ref 37–140)
TSH SERPL-ACNC: 1.2 UIU/ML (ref 0.35–4.94)
UROBILINOGEN UR STRIP-ACNC: NORMAL
VLDLC SERPL CALC-MCNC: 19 MG/DL
WBC # BLD AUTO: 6.33 X10(3)/MCL (ref 4.5–11.5)
WBC #/AREA URNS AUTO: ABNORMAL /HPF

## 2025-06-11 PROCEDURE — 80061 LIPID PANEL: CPT

## 2025-06-11 PROCEDURE — 83970 ASSAY OF PARATHORMONE: CPT

## 2025-06-11 PROCEDURE — 84443 ASSAY THYROID STIM HORMONE: CPT

## 2025-06-11 PROCEDURE — 82306 VITAMIN D 25 HYDROXY: CPT

## 2025-06-11 PROCEDURE — 80053 COMPREHEN METABOLIC PANEL: CPT

## 2025-06-11 PROCEDURE — 81001 URINALYSIS AUTO W/SCOPE: CPT

## 2025-06-11 PROCEDURE — 36415 COLL VENOUS BLD VENIPUNCTURE: CPT

## 2025-06-11 PROCEDURE — 85025 COMPLETE CBC W/AUTO DIFF WBC: CPT

## 2025-06-11 RX ORDER — ESTRADIOL 10 UG/1
1 TABLET, FILM COATED VAGINAL
Qty: 10 TABLET | Refills: 11 | Status: SHIPPED | OUTPATIENT
Start: 2025-06-11 | End: 2026-06-11

## 2025-06-11 RX ORDER — FAMOTIDINE 40 MG/1
40 TABLET, FILM COATED ORAL DAILY
Qty: 30 TABLET | Refills: 11 | Status: SHIPPED | OUTPATIENT
Start: 2025-06-11 | End: 2026-06-11

## 2025-06-11 NOTE — PROGRESS NOTES
Internal Medicine    Patient ID: [id number]     Chief Complaint: Medicare AWV Follow Up      HPI:     Carmen Oh is a 70 y.o. female here today for a follow up.     Patient reports acid reflux, currently controlled with Protonix. She has urinary discomfort, with a history of similar symptoms previously attributed to hormonal changes rather than infection. She also reports vaginal dryness associated with vaginal atrophy. She previously used a vaginal suppository prescribed by Dr. Herman for this issue.    She has been walking for exercise for the past 3 weeks but reports no weight loss. She mentions maintaining a stable weight for years.    Regarding her calcium levels, her most recent result was 8.8, which she notes is slightly decreased. Her vitamin D levels have fluctuated, with the most recent result being 74, compared to previous results of 46 in October and 92 in November of the previous year.    She denies having precancerous conditions or Alegre's esophagus. She states her parathyroid gland has been removed for years.    TEST RESULTS:  Patient's calcium level is slightly decreased at 8.8. Her PTH (Parathyroid Hormone) is undetectable, confirming the absence of parathyroid. Vitamin D level is 74, with previous results of 92 in November 2023 and 46 in October. Urinalysis initially showed trace blood detected by computer, but the pathologist found none. White blood cells and trace bacteria were present, but the culture did not grow anything. Patient's bone density is starting to decline.     Dr. Willams/oncology-history of breast cancer; 7 year out; and is now going to be seen every July.   Dr. Camacho/endocrinology-1981-thyroid lump one half of thyroid then had partial thyroidectomy. Subsequently became hyperthyroid and put on PTU for 7 years then had thyroidectomy of other lobe. Parathyroids not salvaged and patient now on calcium supplementation.  Dr. Herman/GYN, up-to-date with Pap and mammogram    Ceci/GI, C scope in , GERD - recall in 7 years  LDL at 94 on Crestor  On calcitriol now BID, prior to 2022 she had a calcium of 7.3.  Now she is at 9  Advised to not take more than 6 tums a day; since she has been on the calcitiol 2 times a day she is not having to take any tums  Caltrate 600 BID       Past Medical History:   Diagnosis Date    Hypertension 2022    Hypocalcemia     Hypokalemia 2022    Hypothyroid 2022    Malignant neoplasm of breast 2022    Mixed hyperlipidemia 2022    Personal history of colonic polyps 2020    S/P parathyroidectomy         Past Surgical History:   Procedure Laterality Date    BREAST BIOPSY Right     BREAST LUMPECTOMY Right      SECTION      X 2    COLONOSCOPY  2020    ALFONZO Ornelas MD    THYROIDECTOMY      TONSILLECTOMY          Social History     Tobacco Use    Smoking status: Never    Smokeless tobacco: Never   Substance and Sexual Activity    Alcohol use: Not Currently     Comment: 1- 2 times/month    Drug use: Never    Sexual activity: Not Currently        Current Outpatient Medications   Medication Instructions    atorvastatin (LIPITOR) 40 mg, Oral, Nightly    calcitRIOL (ROCALTROL) 0.5 mcg, Oral, 2 times daily    CALCIUM CITRATE-VITAMIN D3 ORAL 1 capsule, 2 times daily    estradioL (VAGIFEM) 10 mcg, Vaginal, Every 3 days    famotidine (PEPCID) 40 mg, Oral, Daily    hydroCHLOROthiazide (HYDRODIURIL) 25 mg, Oral    levocetirizine (XYZAL) 5 MG tablet 1 tablet, Nightly    levothyroxine (SYNTHROID) 100 mcg, Oral, Before breakfast       Review of patient's allergies indicates:  No Known Allergies     Patient Care Team:  Tacos Falcon MD as PCP - General (Internal Medicine)  CASSIE Ornelas MD as Consulting Physician (Gastroenterology)  Antonio Willams MD as Consulting Physician (Oncology)     Subjective:     Review of Systems    12 point review of systems conducted, negative except as stated in  "the history of present illness. See HPI for details.    Objective:     Visit Vitals  /76 (BP Location: Left arm, Patient Position: Sitting)   Pulse 66   Temp 97.7 °F (36.5 °C) (Temporal)   Resp 16   Ht 5' 6" (1.676 m)   Wt 74.8 kg (165 lb)   SpO2 96%   BMI 26.63 kg/m²       Physical Exam  Constitutional:       Appearance: Normal appearance.   HENT:      Head: Normocephalic and atraumatic.   Eyes:      Extraocular Movements: Extraocular movements intact.      Pupils: Pupils are equal, round, and reactive to light.   Cardiovascular:      Rate and Rhythm: Normal rate and regular rhythm.   Pulmonary:      Effort: Pulmonary effort is normal.      Breath sounds: Normal breath sounds.   Skin:     General: Skin is warm and dry.   Neurological:      General: No focal deficit present.      Mental Status: She is alert.   Psychiatric:         Mood and Affect: Mood normal.         Labs Reviewed:     Chemistry:  Lab Results   Component Value Date     06/11/2025    K 4.0 06/11/2025    BUN 17.7 06/11/2025    CREATININE 0.98 06/11/2025    EGFRNORACEVR >60 06/11/2025    CALCIUM 8.8 06/11/2025    ALKPHOS 88 06/11/2025    ALBUMIN 4.0 06/11/2025    BILIDIR 0.3 01/19/2022    IBILI 0.40 01/19/2022    AST 21 06/11/2025    ALT 15 06/11/2025    MG 1.50 (L) 02/27/2024    PHOS 5.1 (H) 06/16/2017    MGVWXRBO01XL 74 06/11/2025    TSH 1.201 06/11/2025    [id number] 1.38 03/20/2023        Lab Results   Component Value Date    HGBA1C 6.0 04/19/2021        Hematology:  Lab Results   Component Value Date    WBC 6.33 06/11/2025    HGB 14.6 06/11/2025    HCT 45.1 06/11/2025     06/11/2025       Lipid Panel:  Lab Results   Component Value Date    CHOL 175 06/11/2025    HDL 58 06/11/2025    LDL 98.00 06/11/2025    TRIG 93 06/11/2025    TOTALCHOLEST 3 06/11/2025        Urine:  Lab Results   Component Value Date    APPEARANCEUA Clear 06/11/2025    SGUA 1.018 06/11/2025    PROTEINUA Negative 06/11/2025    KETONESUA Negative 06/11/2025    " LEUKOCYTESUR 75 (A) 06/11/2025    RBCUA 0-5 06/11/2025    WBCUA 6-10 (A) 06/11/2025    BACTERIA Trace 06/11/2025    SQEPUA Trace 06/11/2025    HYALINECASTS 0-2 (A) 06/11/2025        Assessment and Plan:     Assessment & Plan    N95.2 Vaginal atrophy  E83.51 Hypocalcemia  E89.2 Postprocedural hypoparathyroidism  I10 Primary hypertension  E78.2 Mixed hyperlipidemia  Z12.31 Screening mammogram for breast cancer  E89.0 Postoperative hypothyroidism  Z00.00 Well adult exam      IMPRESSION:   Considered step-down therapy from Protonix to Pepcid for acid reflux management, balancing symptom control with long-term risks of prolonged PPI use including osteoporosis, renal disease, and stomach polyps.   Evaluated calcium levels in context of parathyroidectomy history and vitamin D supplementation.   Assessed urinalysis results showing trace blood and WBCs; interpreted findings as likely related to vaginal atrophy rather than potential urinary tract irritation and vaginal dryness.   Considered reintroduction of vaginal estrogen therapy to address symptoms of vaginal atrophy.    N95.2 VAGINAL ATROPHY:  - Prescribed Vagifem (vaginal estrogen suppository) to be used twice weekly at bedtime for vaginal atrophy symptoms.    E83.51 HYPOCALCEMIA:  - Discussed the relationship between vitamin D and calcium absorption with the patient.    Z12.31 SCREENING MAMMOGRAM FOR BREAST CANCER:  - Ordered mammogram for July.           Follow up in about 6 months (around 12/11/2025) for LABS PRIOR TO VISIT; revisit on calcium and vit D. In addition to their scheduled follow up, the patient has also been instructed to follow up on as needed basis.     Future Appointments   Date Time Provider Department Center   10/10/2025  8:00 [facility]   10/14/2025 10:00 [facility]   12/11/2025 10:40 AM Tacos Falcon MD [facility] [id number]        Tacos Falcon MD

## 2025-06-18 DIAGNOSIS — I10 HYPERTENSION, UNSPECIFIED TYPE: ICD-10-CM

## 2025-06-18 RX ORDER — HYDROCHLOROTHIAZIDE 25 MG/1
25 TABLET ORAL
Qty: 90 TABLET | Refills: 0 | Status: SHIPPED | OUTPATIENT
Start: 2025-06-18

## 2025-07-09 DIAGNOSIS — E55.9 VITAMIN D DEFICIENCY: ICD-10-CM

## 2025-07-09 RX ORDER — CALCITRIOL 0.5 UG/1
0.5 CAPSULE ORAL 2 TIMES DAILY
Qty: 90 CAPSULE | Refills: 0 | Status: SHIPPED | OUTPATIENT
Start: 2025-07-09

## 2025-08-01 ENCOUNTER — OFFICE VISIT (OUTPATIENT)
Dept: INTERNAL MEDICINE | Facility: CLINIC | Age: 70
End: 2025-08-01
Payer: MEDICARE

## 2025-08-01 VITALS
OXYGEN SATURATION: 98 % | SYSTOLIC BLOOD PRESSURE: 118 MMHG | BODY MASS INDEX: 25.88 KG/M2 | DIASTOLIC BLOOD PRESSURE: 84 MMHG | HEIGHT: 66 IN | RESPIRATION RATE: 20 BRPM | HEART RATE: 88 BPM | WEIGHT: 161 LBS

## 2025-08-01 DIAGNOSIS — R60.1 GENERALIZED EDEMA: ICD-10-CM

## 2025-08-01 DIAGNOSIS — T63.301A SPIDER BITE WOUND, ACCIDENTAL OR UNINTENTIONAL, INITIAL ENCOUNTER: ICD-10-CM

## 2025-08-01 DIAGNOSIS — M79.89 LEG SWELLING: Primary | ICD-10-CM

## 2025-08-01 DIAGNOSIS — I83.90 VARICOSE VEINS OF CALF: ICD-10-CM

## 2025-08-01 RX ORDER — DOXYCYCLINE 100 MG/1
100 CAPSULE ORAL EVERY 12 HOURS
Qty: 14 CAPSULE | Refills: 0 | Status: SHIPPED | OUTPATIENT
Start: 2025-08-01 | End: 2025-08-08

## 2025-08-01 NOTE — PROGRESS NOTES
Internal Medicine    Patient ID: [id number]     Chief Complaint: Foot Swelling (Right and Left foot swelling, believes she got bite by a spider on the (R) ankle (07/04/25).  Swelling started to subside last night. )      HPI:     Carmen Oh is a 70 y.o. female here today for a follow up.     History of Present Illness    CHIEF COMPLAINT:  Patient presents today for right ankle swelling and pain that started after a suspected spider bite about a month ago.    HISTORY OF PRESENT ILLNESS:  She reports a suspected spider bite on the right ankle initially noticed on July 4th around 6:00-7:00 PM. She denies feeling an actual sting at the time. A round, red area approximately 2-3 inches in diameter developed the same night. Initial self-treatment included applying and taking Benadryl and elevating the ankle while sleeping. The bite area improved the next day, but swelling persisted and the area bruised. She experienced intermittent ankle swelling in the weeks following the initial incident. The ankle became swollen again the Friday night before her planned cruise. During the cruise, she developed bilateral leg swelling that progressively worsened, extending from feet to knees over several days. The affected areas are very sensitive but she denies associated pain, fever, chills, or other systemic symptoms. Her son researched potential spider bite characteristics and suspects a brown recluse spider bite, which was supported by an  finding brown recluse spiders in the vicinity. She denies observing necrosis or significant skin changes around the bite area. She notes wearing tennis shoes to manage swelling and discomfort.    MEDICAL HISTORY:  She has a history of severe reactions to wasp and bee stings, characterized by significant swelling and burning sensation. She does not require EpiPen but often needs cortisone injections, particularly if stings occur near ankle or rib, to prevent prolonged infection  and swelling. She is status post thyroidectomy with concurrent parathyroidectomy, currently without thyroid and parathyroid glands. She has been diagnosed with varicose veins, which appear to be worsening over time.    MEDICATIONS:  She is currently taking hydrochlorothiazide due to her thyroid and parathyroid status. She also takes calcium supplementation to prevent potential kidney stones and kidney complications.    SOCIAL HISTORY:  She reports prior baseline activity of walking 2-3 miles daily before the cruise. 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          Past Medical History:   Diagnosis Date    Hypertension 2022    Hypocalcemia     Hypokalemia 2022    Hypothyroid 2022    Malignant neoplasm of breast 2022    Mixed hyperlipidemia 2022    Personal history of colonic polyps 2020    S/P parathyroidectomy         Past Surgical History:   Procedure Laterality Date    BREAST BIOPSY Right     BREAST LUMPECTOMY Right      SECTION      X 2    COLONOSCOPY  2020    ALFONZO Ornelas MD    THYROIDECTOMY      TONSILLECTOMY          Social History     Tobacco Use    Smoking status: Never    Smokeless tobacco: Never   Substance and Sexual Activity    Alcohol use: Not Currently     Comment: 1- 2 times/month    Drug use: Never    Sexual activity: Not Currently        Current Outpatient Medications   Medication Instructions    atorvastatin (LIPITOR) 40 mg, Oral, Nightly    calcitRIOL (ROCALTROL) 0.5 mcg, Oral, 2 times daily    CALCIUM CITRATE-VITAMIN D3 ORAL 1 capsule, 2 times daily    doxycycline (MONODOX) 100 mg, Oral, Every 12 hours    estradioL (VAGIFEM) 10 mcg, Vaginal, Every 3 days    famotidine (PEPCID) 40 mg, Oral, Daily    hydroCHLOROthiazide (HYDRODIURIL) 25 mg, Oral    levocetirizine (XYZAL) 5 MG tablet 1 tablet, Nightly    levothyroxine (SYNTHROID) 100 mcg, Oral, Before breakfast       Review of patient's allergies  "indicates:  No Known Allergies     Patient Care Team:  Tacos Falcon MD as PCP - General (Internal Medicine)  CASSIE Ornelas MD as Consulting Physician (Gastroenterology)  Antonio Willams MD as Consulting Physician (Oncology)     Subjective:     Review of Systems    12 point review of systems conducted, negative except as stated in the history of present illness. See HPI for details.    Objective:     Visit Vitals  /84   Pulse 88   Resp 20   Ht 5' 6" (1.676 m)   Wt 73 kg (161 lb)   SpO2 98%   BMI 25.99 kg/m²       Physical Exam    Physical Exam    General: No acute distress. Well-developed. Well-nourished.  Eyes: EOMI. Sclerae anicteric.  HENT: Normocephalic. Atraumatic. Nares patent. Moist oral mucosa.  Cardiovascular: Regular rate. Regular rhythm. No murmurs. No rubs. No gallops. Normal S1, S2. Normal pedal pulses bilaterally. Varicose veins present.  Respiratory: Normal respiratory effort. Clear to auscultation bilaterally. No rales. No rhonchi. No wheezing.  Musculoskeletal: No  obvious deformity. Bilateral foot puffiness. Legs warm to touch.  Extremities: Localized right ankle swelling.   Neurological: Alert & oriented x3. No slurred speech. Normal gait.  Psychiatric: Normal mood. Normal affect. Good insight. Good judgment.  Skin: Warm. Dry. No rash.  MSK: Foot/Ankle - Right: Right ankle bone swollen.         Labs Reviewed:     Chemistry:  Lab Results   Component Value Date     06/11/2025    K 4.0 06/11/2025    BUN 17.7 06/11/2025    CREATININE 0.98 06/11/2025    EGFRNORACEVR >60 06/11/2025    CALCIUM 8.8 06/11/2025    ALKPHOS 88 06/11/2025    ALBUMIN 4.0 06/11/2025    BILIDIR 0.3 01/19/2022    IBILI 0.40 01/19/2022    AST 21 06/11/2025    ALT 15 06/11/2025    MG 1.50 (L) 02/27/2024    PHOS 5.1 (H) 06/16/2017    [id number] 74 06/11/2025    TSH 1.201 06/11/2025    [id number] 1.38 03/20/2023        Lab Results   Component Value Date    HGBA1C 6.0 04/19/2021    "     Hematology:  Lab Results   Component Value Date    WBC 6.33 06/11/2025    HGB 14.6 06/11/2025    HCT 45.1 06/11/2025     06/11/2025       Lipid Panel:  Lab Results   Component Value Date    CHOL 175 06/11/2025    HDL 58 06/11/2025    TRIG 93 06/11/2025    TOTALCHOLEST 3 06/11/2025    LDLCALC 98.00 06/11/2025        Urine:  Lab Results   Component Value Date    APPEARANCEUA Clear 06/11/2025    SGUA 1.018 06/11/2025    PROTEINUA Negative 06/11/2025    KETONESUA Negative 06/11/2025    LEUKOCYTESUR 75 (A) 06/11/2025    RBCUA 0-5 06/11/2025    WBCUA 6-10 (A) 06/11/2025    BACTERIA Trace 06/11/2025    SQEPUA Trace 06/11/2025    HYALINECASTS 0-2 (A) 06/11/2025        Assessment/Plan:     Assessment & Plan    ## LOCALIZED EDEMA:  - Observed bilateral swelling in the ankle and feet, which worsened during a cruise and extended up to the knees.  - Examination revealed good pulses in the feet and warm legs, indicating no immediate flow problems.  - Obtain BLE NIVA to rule out blood clot & evaluate venous flow.   - Assessed that swelling is likely related to recent cruise travel, increased sodium intake, and potential venous insufficiency given patient's age and varicose veins.  - Instructed the patient to elevate feet above heart level when resting, avoid tight shoes that compress feet, drink plenty of water, and avoid salty foods over the weekend.  - Send referral to vascular surgery for further management.     ## SPIDER BITE:  - Observed a lump and a round ring around the potential bite area  - No necrosis or severe symptoms like fever or chills were reported.  - Discussed the possibility of a brown recluse spider bite and the typical symptoms associated with it, such as necrosis, which the patient does not have.  - Will treat with doxycycline 100 mg BID x 7 days due to the swelling in her right ankle.     ## FOLLOW-UP:  - Directed the patient to contact the office sooner if experiencing sudden pain, erythema, or  increased swelling on one side, which could indicate a blood clot.         No follow-ups on file. In addition to their scheduled follow up, the patient has also been instructed to follow up on as needed basis.     Future Appointments   Date Time Provider Department Center   10/10/2025  8:00 AM LAB, [facility] LAB [facility]   10/14/2025 10:00 AM Destinee Kaiser FNP [facility]   12/11/2025 10:40 AM Tacos Falcon MD [facility] [id number]        REZA Crooks    This note was generated with the assistance of ambient listening technology. Verbal consent was obtained by the patient and accompanying visitor(s) for the recording of patient appointment to facilitate this note. I attest to having reviewed and edited the generated note for accuracy, though some syntax or spelling errors may persist. Please contact the author of this note for any clarification.

## 2025-08-04 ENCOUNTER — RESULTS FOLLOW-UP (OUTPATIENT)
Dept: INTERNAL MEDICINE | Facility: CLINIC | Age: 70
End: 2025-08-04
Payer: MEDICARE

## 2025-08-04 NOTE — PROGRESS NOTES
Please inform patient of results.    BLE US reviewed and negative for acute DVT. Can you please follow-up and ask how the ankle swelling is since starting the doxycycline?

## 2025-08-13 DIAGNOSIS — K21.9 GASTROESOPHAGEAL REFLUX DISEASE, UNSPECIFIED WHETHER ESOPHAGITIS PRESENT: ICD-10-CM

## 2025-08-13 DIAGNOSIS — I10 HYPERTENSION, UNSPECIFIED TYPE: ICD-10-CM

## 2025-08-13 DIAGNOSIS — E03.9 HYPOTHYROIDISM, UNSPECIFIED TYPE: ICD-10-CM

## 2025-08-13 RX ORDER — LEVOTHYROXINE SODIUM 100 UG/1
100 TABLET ORAL
Qty: 90 TABLET | Refills: 0 | Status: SHIPPED | OUTPATIENT
Start: 2025-08-13

## 2025-08-13 RX ORDER — PANTOPRAZOLE SODIUM 40 MG/1
40 TABLET, DELAYED RELEASE ORAL
Qty: 90 TABLET | Refills: 0 | OUTPATIENT
Start: 2025-08-13

## 2025-08-13 RX ORDER — ATORVASTATIN CALCIUM 40 MG/1
40 TABLET, FILM COATED ORAL NIGHTLY
Qty: 90 TABLET | Refills: 0 | Status: SHIPPED | OUTPATIENT
Start: 2025-08-13

## 2025-08-15 DIAGNOSIS — E03.9 HYPOTHYROIDISM, UNSPECIFIED TYPE: ICD-10-CM

## 2025-08-16 DIAGNOSIS — I10 HYPERTENSION, UNSPECIFIED TYPE: ICD-10-CM

## 2025-08-18 RX ORDER — ATORVASTATIN CALCIUM 40 MG/1
40 TABLET, FILM COATED ORAL NIGHTLY
Qty: 90 TABLET | Refills: 0 | Status: SHIPPED | OUTPATIENT
Start: 2025-08-18

## 2025-08-18 RX ORDER — LEVOTHYROXINE SODIUM 100 UG/1
100 TABLET ORAL
Qty: 90 TABLET | Refills: 0 | Status: SHIPPED | OUTPATIENT
Start: 2025-08-18

## 2025-08-19 DIAGNOSIS — E55.9 VITAMIN D DEFICIENCY: ICD-10-CM

## 2025-08-19 RX ORDER — CALCITRIOL 0.5 UG/1
0.5 CAPSULE ORAL 2 TIMES DAILY
Qty: 90 CAPSULE | Refills: 0 | Status: SHIPPED | OUTPATIENT
Start: 2025-08-19

## 2025-08-21 ENCOUNTER — HOSPITAL ENCOUNTER (OUTPATIENT)
Dept: RADIOLOGY | Facility: HOSPITAL | Age: 70
Discharge: HOME | End: 2025-08-21
Attending: INTERNAL MEDICINE
Payer: MEDICARE

## 2025-08-21 DIAGNOSIS — Z12.31 SCREENING MAMMOGRAM FOR BREAST CANCER: ICD-10-CM

## 2025-08-21 PROCEDURE — 77063 BREAST TOMOSYNTHESIS BI: CPT | Mod: TC
